# Patient Record
Sex: FEMALE | Race: WHITE | Employment: FULL TIME | URBAN - METROPOLITAN AREA
[De-identification: names, ages, dates, MRNs, and addresses within clinical notes are randomized per-mention and may not be internally consistent; named-entity substitution may affect disease eponyms.]

---

## 2017-10-05 ENCOUNTER — OFFICE VISIT (OUTPATIENT)
Dept: OBGYN CLINIC | Age: 42
End: 2017-10-05
Payer: COMMERCIAL

## 2017-10-05 ENCOUNTER — HOSPITAL ENCOUNTER (OUTPATIENT)
Age: 42
Setting detail: SPECIMEN
Discharge: HOME | End: 2017-10-05
Payer: COMMERCIAL

## 2017-10-05 VITALS
HEIGHT: 67 IN | WEIGHT: 233 LBS | RESPIRATION RATE: 16 BRPM | BODY MASS INDEX: 36.57 KG/M2 | OXYGEN SATURATION: 100 % | HEART RATE: 96 BPM | SYSTOLIC BLOOD PRESSURE: 117 MMHG | DIASTOLIC BLOOD PRESSURE: 78 MMHG

## 2017-10-05 DIAGNOSIS — Z01.419 ENCOUNTER FOR ROUTINE GYNECOLOGICAL EXAMINATION WITH PAPANICOLAOU SMEAR OF CERVIX: Primary | ICD-10-CM

## 2017-10-05 PROCEDURE — 99395 PREV VISIT EST AGE 18-39: CPT | Performed by: OBSTETRICS & GYNECOLOGY

## 2017-10-06 NOTE — PROGRESS NOTES
Johnn Litten  10/5/2017              46 y.o. Chief Complaint   Patient presents with    Gynecologic Exam     last pap was 16 wnl     Breast Problem     nipple pimple               No referring provider defined for this encounter. The patient was seen and examined. She has no chief complaint today and is here for her annual exam.  Her bowels are regular. There are no voiding complaints. She denies any bloating. She denies vaginal discharge. HPI : 44yo  for annual exam, had an area on areolae that was like a pimple, seems to have popped and now healing.   ________________________________________________________________________    Past Medical History:   Diagnosis Date    Asthma     Gestational diabetes     Varicosities                                                                    Past Surgical History:   Procedure Laterality Date    ABDOMEN SURGERY       SECTION      DILATION AND CURETTAGE OF UTERUS       History reviewed. No pertinent family history. History   Smoking Status    Never Smoker   Smokeless Tobacco    Never Used     History   Alcohol Use No       MEDICATIONS:  Current Outpatient Prescriptions   Medication Sig Dispense Refill    ibuprofen (ADVIL;MOTRIN) 800 MG tablet Take 1 tablet by mouth every 8 hours as needed 60 tablet 0    PROAIR  (90 BASE) MCG/ACT inhaler   0    ALBUTEROL IN Inhale  into the lungs as needed.  fluticasone-salmeterol (ADVAIR DISKUS) 250-50 MCG/DOSE AEPB Inhale 1 puff into the lungs daily.  fish oil-omega-3 fatty acids 1000 MG capsule Take  by mouth daily. Indications: 2 pills once daily pt not sure of the dose       No current facility-administered medications for this visit. ALLERGIES:  Iv contrast [iodides]  Immunization status: stated as current, but no records available. Gynecologic History:     Patient's last menstrual period was 2017 (exact date).     Sexually Active: Yes    STD History: No     Permanent Sterilization: vas  Reversible Birth Control: No        Hormone Replacement Exposure: No      Family History of Breast, Ovarian , Colon or Uterine Cancer: No   If YES see scanned worksheet. Preventative Health Testing:  Date of Last Pap Smear: 2016  Abnormal Pap Smear History:   Colposcopy History:   Date of Last Mammogram: na  Date of Last Colonoscopy:   Date of Last Bone Density:      ________________________________________________________________________  REVIEW OF SYSTEMS:        A minimum of an eleven point review of systems was completed and found to be negative except for the pertinent positives found below. Constitutional: No fever, chills or malaise; No weight change or fatigue  Head and Eyes: No vision, Headache, Dizziness or trauma in last 12 months  ENT ROS: No hearing, Tinnitis, sinus or taste problems  Hematological and Lymphatic ROS:No Lymphoma, Von Willebrand's, Hemophillia or Bleeding History  Psych ROS: No Depression, Homicidal thoughts,suicidal thoughts, or anxiety  Breast ROS: No prior breast abnormalities or lumps  Respiratory ROS: No SOB, Pneumoniae,Cough, or Pulmonary Embolism History  Cardiovascular ROS: No Chest Pain with Exertion, Palpitations, Syncope, Edema, Arrhythmia  Gastrointestinal ROS: No Indigestion, Heartburn, Nausea, vomiting, Diahrea, Constipation,or Bowel Changes; No Bloody Stools or melena  Genito-Urinary ROS: No Dysuria, Hematuria or Nocturia.  No Urinary Incontinence or Vaginal Discharge  Musculoskeletal ROS: No Arthralgia, Arthritis,Gout,Osteoporosis or Rheumatism  Neurological ROS: No CVA, Migraines, Epilepsy, Seizure Hx, or Limb Weakness  Dermatological ROS: No Rash, Itching, Hives, Mole Changes or Cancer                                                                                                                                                                                                                                PHYSICAL Exam: Constitutional:  Blood pressure 117/78, pulse 96, resp. rate 16, height 5' 7\" (1.702 m), weight 233 lb (105.7 kg), last menstrual period 09/14/2017, SpO2 100 %, not currently breastfeeding. General Appearance: This  is a well Developed, well Nourished, well groomed female. Her BMI was reviewed. Nutritional decision making was discussed. Skin:  There was a Normal Inspection of the skin without rashes or lesions. There were no rashes. (Papular, Maculopapular, Hives, Pustular, Macular)     There were no lesions (Ulcers, Erythema, Abn. Appearing Nevi)        Lymphatic:  No Lymph Nodes were Palpable in the neck , axilla or groin. Inguinal lymph nodes wnl   Neck and EENT:  The neck was supple. There were no masses   The thyroid was not enlarged and had no masses. Perrla, EOMI B/L  Respiratory: The lungs were auscultated and found to be clear. There were no rales, rhonchi or wheezes. There was a good respiratory effort. Cardiovascular: The heart was in a regular rate and rhythm. . No S3 or S4. There was no murmur appreciated. Location, grade, and radiation are not applicable. Extremities: The patients extremities were without calf tenderness, edema, or varicosities. There was full range of motion in all four extremities. Abdomen: The abdomen was soft and non-tender. There were good bowel sounds in all quadrants and there was no guarding, rebound or rigidity. On evaluation there was no evidence of hepatosplenomegaly and there was no costal vertebral mariely tenderness bilaterally. No hernias were appreciated. Abdominal Scars: healed    Psych: The patient had a normal Orientation to: Time, Place, Person, and Situation  There is no Mood / Affect changes    Breast:  (Chest)  normal appearance, no masses or tenderness, healing nipple area  Self breast exams were reviewed in detail. Literature was given.     Pelvic Exam:  Vulva and vagina appear normal. Bimanual exam reveals normal uterus and

## 2017-10-12 LAB — CYTOLOGY REPORT: NORMAL

## 2018-03-19 ENCOUNTER — OFFICE VISIT (OUTPATIENT)
Dept: OBGYN CLINIC | Age: 43
End: 2018-03-19
Payer: COMMERCIAL

## 2018-03-19 VITALS
BODY MASS INDEX: 36.1 KG/M2 | HEART RATE: 101 BPM | DIASTOLIC BLOOD PRESSURE: 82 MMHG | WEIGHT: 230 LBS | HEIGHT: 67 IN | SYSTOLIC BLOOD PRESSURE: 122 MMHG

## 2018-03-19 DIAGNOSIS — R10.31 RIGHT LOWER QUADRANT ABDOMINAL PAIN: Primary | ICD-10-CM

## 2018-03-19 PROCEDURE — 99213 OFFICE O/P EST LOW 20 MIN: CPT | Performed by: OBSTETRICS & GYNECOLOGY

## 2018-03-19 PROCEDURE — G8484 FLU IMMUNIZE NO ADMIN: HCPCS | Performed by: OBSTETRICS & GYNECOLOGY

## 2018-03-19 PROCEDURE — G8427 DOCREV CUR MEDS BY ELIG CLIN: HCPCS | Performed by: OBSTETRICS & GYNECOLOGY

## 2018-03-19 PROCEDURE — 1036F TOBACCO NON-USER: CPT | Performed by: OBSTETRICS & GYNECOLOGY

## 2018-03-19 PROCEDURE — G8417 CALC BMI ABV UP PARAM F/U: HCPCS | Performed by: OBSTETRICS & GYNECOLOGY

## 2018-04-05 ENCOUNTER — HOSPITAL ENCOUNTER (OUTPATIENT)
Dept: ULTRASOUND IMAGING | Age: 43
Discharge: HOME | End: 2018-04-07
Payer: COMMERCIAL

## 2018-04-05 DIAGNOSIS — R10.31 RIGHT LOWER QUADRANT ABDOMINAL PAIN: ICD-10-CM

## 2018-04-05 PROCEDURE — 76830 TRANSVAGINAL US NON-OB: CPT

## 2018-04-05 PROCEDURE — 76856 US EXAM PELVIC COMPLETE: CPT

## 2018-10-07 ENCOUNTER — HOSPITAL ENCOUNTER (EMERGENCY)
Age: 43
Discharge: HOME | End: 2018-10-07
Payer: COMMERCIAL

## 2018-10-07 ENCOUNTER — APPOINTMENT (OUTPATIENT)
Dept: CT IMAGING | Age: 43
End: 2018-10-07
Payer: COMMERCIAL

## 2018-10-07 VITALS
OXYGEN SATURATION: 97 % | WEIGHT: 230 LBS | BODY MASS INDEX: 36.1 KG/M2 | RESPIRATION RATE: 18 BRPM | HEIGHT: 67 IN | SYSTOLIC BLOOD PRESSURE: 131 MMHG | HEART RATE: 114 BPM | TEMPERATURE: 98.2 F | DIASTOLIC BLOOD PRESSURE: 72 MMHG

## 2018-10-07 DIAGNOSIS — S09.90XA INJURY OF HEAD, INITIAL ENCOUNTER: Primary | ICD-10-CM

## 2018-10-07 PROCEDURE — 99284 EMERGENCY DEPT VISIT MOD MDM: CPT

## 2018-10-07 PROCEDURE — 70450 CT HEAD/BRAIN W/O DYE: CPT

## 2018-10-07 RX ORDER — BUTALBITAL, ACETAMINOPHEN AND CAFFEINE 50; 325; 40 MG/1; MG/1; MG/1
1 TABLET ORAL EVERY 4 HOURS PRN
Qty: 20 TABLET | Refills: 0 | Status: SHIPPED | OUTPATIENT
Start: 2018-10-07 | End: 2019-05-07

## 2018-10-07 RX ORDER — ONDANSETRON 4 MG/1
4 TABLET, ORALLY DISINTEGRATING ORAL EVERY 8 HOURS PRN
Qty: 20 TABLET | Refills: 0 | Status: SHIPPED | OUTPATIENT
Start: 2018-10-07 | End: 2019-05-07

## 2018-10-07 RX ORDER — KETOROLAC TROMETHAMINE 30 MG/ML
60 INJECTION, SOLUTION INTRAMUSCULAR; INTRAVENOUS ONCE
Status: DISCONTINUED | OUTPATIENT
Start: 2018-10-07 | End: 2018-10-07 | Stop reason: HOSPADM

## 2018-10-07 ASSESSMENT — ENCOUNTER SYMPTOMS
NAUSEA: 1
EYE PAIN: 0
PHOTOPHOBIA: 1
VOMITING: 0
SHORTNESS OF BREATH: 0
COUGH: 0
COLOR CHANGE: 0

## 2018-10-07 ASSESSMENT — PAIN DESCRIPTION - PAIN TYPE: TYPE: ACUTE PAIN

## 2018-10-07 ASSESSMENT — PAIN DESCRIPTION - DESCRIPTORS: DESCRIPTORS: ACHING

## 2018-10-07 ASSESSMENT — PAIN SCALES - GENERAL: PAINLEVEL_OUTOF10: 6

## 2018-10-07 ASSESSMENT — PAIN DESCRIPTION - LOCATION: LOCATION: HEAD

## 2018-10-07 NOTE — ED PROVIDER NOTES
kg)   Height: 5' 7\" (1.702 m)       MEDICATIONS GIVEN IN THE ED:  Medications   ketorolac (TORADOL) injection 60 mg (not administered)       CLINICAL DECISION MAKING:  The patient presented alert with a nontoxic appearance and was seen in conjunction with Dr. Shaun Canada. CT scan was negative for acute findings. She was given information on concussions. Prescriptions were written for Zofran and Fioricet. Follow up with Occupational Health, return to ED if condition worsens. FINAL IMPRESSION      1.  Injury of head, initial encounter            Problem List  Patient Active Problem List   Diagnosis Code    PGDM O24.919    Asthma J45.909         DISPOSITION/PLAN   DISPOSITION  DISCHARGE      PATIENT REFERRED TO:   Jocelyne Herman MD  [facility] [id number]  [id number]  [phone]    Schedule an appointment as soon as possible for a visit       [facility] ED  [address]  [phone]    If symptoms worsen, As needed      DISCHARGE MEDICATIONS:     Discharge Medication List as of 10/7/2018 11:33 AM      START taking these medications    Details   ondansetron (ZOFRAN ODT) 4 MG disintegrating tablet Take 1 tablet by mouth every 8 hours as needed for Nausea or Vomiting, Disp-20 tablet, R-0Print      butalbital-acetaminophen-caffeine (FIORICET, ESGIC) -40 MG per tablet Take 1 tablet by mouth every 4 hours as needed for Headaches, Disp-20 tablet, R-0Print                 (Please note that portions of this note were completed with a voice recognition program.  Efforts were made to edit the dictations but occasionally words are mis-transcribed.)    WILFRIDO Jenkins CNP, APRN - CNP  10/07/18 [id number]

## 2019-05-07 ENCOUNTER — OFFICE VISIT (OUTPATIENT)
Dept: OBGYN CLINIC | Age: 44
End: 2019-05-07
Payer: COMMERCIAL

## 2019-05-07 ENCOUNTER — HOSPITAL ENCOUNTER (OUTPATIENT)
Age: 44
Setting detail: SPECIMEN
Discharge: HOME | End: 2019-05-07
Payer: COMMERCIAL

## 2019-05-07 VITALS
WEIGHT: 215 LBS | SYSTOLIC BLOOD PRESSURE: 132 MMHG | DIASTOLIC BLOOD PRESSURE: 81 MMHG | BODY MASS INDEX: 33.74 KG/M2 | HEIGHT: 67 IN | HEART RATE: 97 BPM

## 2019-05-07 DIAGNOSIS — Z12.31 VISIT FOR SCREENING MAMMOGRAM: ICD-10-CM

## 2019-05-07 DIAGNOSIS — Z13.220 SCREENING CHOLESTEROL LEVEL: ICD-10-CM

## 2019-05-07 DIAGNOSIS — Z13.21 ENCOUNTER FOR VITAMIN DEFICIENCY SCREENING: ICD-10-CM

## 2019-05-07 DIAGNOSIS — R53.83 FATIGUE, UNSPECIFIED TYPE: ICD-10-CM

## 2019-05-07 DIAGNOSIS — N92.6 ABNORMAL MENSTRUAL PERIODS: ICD-10-CM

## 2019-05-07 DIAGNOSIS — Z01.419 ENCOUNTER FOR GYNECOLOGICAL EXAMINATION WITHOUT ABNORMAL FINDING: Primary | ICD-10-CM

## 2019-05-07 PROCEDURE — 99396 PREV VISIT EST AGE 40-64: CPT | Performed by: OBSTETRICS & GYNECOLOGY

## 2019-05-07 ASSESSMENT — PATIENT HEALTH QUESTIONNAIRE - PHQ9
SUM OF ALL RESPONSES TO PHQ9 QUESTIONS 1 & 2: 2
SUM OF ALL RESPONSES TO PHQ QUESTIONS 1-9: 2
2. FEELING DOWN, DEPRESSED OR HOPELESS: 1
1. LITTLE INTEREST OR PLEASURE IN DOING THINGS: 1
SUM OF ALL RESPONSES TO PHQ QUESTIONS 1-9: 2

## 2019-05-07 NOTE — PROGRESS NOTES
DATE OF VISIT:  19        History and Physical    Sandra Corado    :  1975  CHIEF COMPLAINT:    Chief Complaint   Patient presents with    Gynecologic Exam     prev pap 2017, neg                    HPI :   Sandra Corado is a 41 y.o. femaleGRAVIDA 3 PARA     Sandra Corado returns today for her annual exam.  She presents today stating that her cycles have gone from every 28 days to know 21-24. Flow is moderately heavy but without any significant cramping. She is having regular bowel movements without constipation or diarrhea. She denies any involuntary loss of urine. 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 She does have a psychologist who she is meeting with later today. She is otherwise without any significant complaints today. Tiffanie Landry has not had a mammogram nor has she had any wellness screening blood work.  _____________________________________________________________________  Past Medical History:   Diagnosis Date    Asthma     Gestational diabetes     Varicosities                                                                    Past Surgical History:   Procedure Laterality Date     SECTION      CHOLECYSTECTOMY      DILATION AND CURETTAGE OF UTERUS       Family History   Problem Relation Age of Onset    Diabetes Brother      Social History     Tobacco Use   Smoking Status Never Smoker   Smokeless Tobacco Never Used     Social History     Substance and Sexual Activity   Alcohol Use No     Current Outpatient Medications   Medication Sig Dispense Refill    PROAIR  (90 BASE) MCG/ACT inhaler   0    ALBUTEROL IN Inhale  into the lungs as needed.  fluticasone-salmeterol (ADVAIR DISKUS) 250-50 MCG/DOSE AEPB Inhale 1 puff into the lungs daily.  fish oil-omega-3 fatty acids 1000 MG capsule Take  by mouth daily.     Indications: 2 pills once daily pt not sure of the dose       No current facility-administered medications for this visit. Allergies: Allergies   Allergen Reactions    Iv Contrast [Iodides] Itching     Given during CT scan       Gynecologic History:  No LMP recorded. Sexually Active: Yes  STD History: No  Abnormal Pap History no  Birth Control: Yes , vasectomy    OB History    Para Term  AB Living   3 1 1 0 1 2   SAB TAB Ectopic Molar Multiple Live Births   0 0 0 0 0 1     ______________________________________________________________________  REVIEW OF SYSTEMS:        Constitutional:  Unexpected weight change no  Neurological:  Frequent headaches  no  Ophthalmic:  Recent visual changes no  ENT:   Difficulty swallowing  no  Breast:              Masses   no     Respiratory:  Shortness of breath  no    Cardiovascular: Chest pain   no     Gastrointestinal: Chronic diarrhea/constipation no   Urogenital:  Urinary incontinence  no                                         Heavy/irregular periods           no                                      Vaginal discharge                   no  Hematological: Bruises easy   no     Endocrine:  Nipple Discharge  no     Hot/Cold Intolerance  no   Psychological:  Mood and affect were wnl yes                                                                                                                                           Physical Exam:    Vitals:    19 1004   BP: 132/81   Site: Right Upper Arm   Position: Sitting   Cuff Size: Large Adult   Pulse: 97   Weight: 215 lb (97.5 kg)   Height: 5' 7\" (1.702 m)       General Appearance:  She does not appear to be in any distress. This  is a well developed, well nourished, well groomed female. Neurological:  The patient is alert and oriented to time, place, person, and situation without any noted sensory motor deficits. Skin:  A brief inspection of the skin revealed no rashes or lesions. Neck:  The neck was supple.   There is no tracheal deviation, thyromegaly or supraclavicular adenopathy appreciated. Breast:   The patients breasts were symmetrical.  Breasts are nontender and there  were no masses, discharge or pathologic skin changes. There is no supraclavicular or axillary adenopathy bilaterally. Respiratory: There was unlabored respiratory effort. Lungs clear to ascultation without wheezes, rales or rhonchi in all fields bilaterally. Cardiovascular:  Normal sinus rhythm with a regular rate and without murmur, rubs or gallops. Abdomen: The abdomen was soft and non-tender with no guarding, rebound, CVAT or rigidity. No hernias were appreciated. Bowel sounds were normally active. Pelvic exam:  No vulvar, vaginal or cervical lesions are noted. Normal vaginal discharge present, no significant cystocele, rectocele or enterocele noted. Uterus nongravid and without CMT and adnexa nontender and without abnormal masses bilaterally. Extremities:  FROM and nontender without clubbing cyanosis or edema. ASSESSMENT:         ICD-10-CM    1. Encounter for gynecological examination without abnormal finding Z01.419 PAP SMEAR   2. Visit for screening mammogram Z12.31 WENDY DIGITAL SCREEN W CAD BILATERAL   3. Fatigue, unspecified type R53.83 Vitamin D 25 Hydroxy     TSH without Reflex     CBC Auto Differential     Vitamin B12   4. Screening cholesterol level Z13.220 Lipid, Fasting   5. Encounter for vitamin deficiency screening Z13.21 Vitamin D 25 Hydroxy     TSH without Reflex     CBC Auto Differential     Vitamin B12   6. Abnormal menstrual periods N92.6                    PLAN:    Return to the office in 1 year or when necessary  Patient was seen with total face to face time of 20 minutes. Carlos A Billings M.D., Mph  [facility]  [place]

## 2019-05-07 NOTE — PATIENT INSTRUCTIONS
Please get your mammogram done at your convenience. We will contact you with the results of your mammogram, blood work and today's Pap smear. Return to the office in 1 year or as needed. Have a fantastic summer and wonderful year!

## 2019-05-13 DIAGNOSIS — Z13.220 SCREENING CHOLESTEROL LEVEL: ICD-10-CM

## 2019-05-13 DIAGNOSIS — Z13.21 ENCOUNTER FOR VITAMIN DEFICIENCY SCREENING: ICD-10-CM

## 2019-05-13 DIAGNOSIS — R53.83 FATIGUE, UNSPECIFIED TYPE: ICD-10-CM

## 2019-05-14 LAB — CYTOLOGY REPORT: NORMAL

## 2019-05-16 ENCOUNTER — HOSPITAL ENCOUNTER (OUTPATIENT)
Dept: MAMMOGRAPHY | Age: 44
Discharge: HOME | End: 2019-05-18
Payer: COMMERCIAL

## 2019-05-16 DIAGNOSIS — Z12.31 VISIT FOR SCREENING MAMMOGRAM: ICD-10-CM

## 2019-05-16 PROCEDURE — 77063 BREAST TOMOSYNTHESIS BI: CPT

## 2020-09-03 ENCOUNTER — OFFICE VISIT (OUTPATIENT)
Dept: OBGYN CLINIC | Age: 45
End: 2020-09-03
Payer: COMMERCIAL

## 2020-09-03 ENCOUNTER — HOSPITAL ENCOUNTER (OUTPATIENT)
Age: 45
Setting detail: SPECIMEN
Discharge: HOME | End: 2020-09-03
Payer: COMMERCIAL

## 2020-09-03 VITALS
DIASTOLIC BLOOD PRESSURE: 70 MMHG | WEIGHT: 238 LBS | HEIGHT: 67 IN | BODY MASS INDEX: 37.35 KG/M2 | SYSTOLIC BLOOD PRESSURE: 122 MMHG

## 2020-09-03 PROCEDURE — 99396 PREV VISIT EST AGE 40-64: CPT | Performed by: OBSTETRICS & GYNECOLOGY

## 2020-09-03 RX ORDER — FLUOXETINE HYDROCHLORIDE 20 MG/1
20 CAPSULE ORAL DAILY
COMMUNITY

## 2020-09-03 RX ORDER — MONTELUKAST SODIUM 10 MG/1
10 TABLET ORAL NIGHTLY
COMMUNITY

## 2020-09-03 RX ORDER — DEXTROAMPHETAMINE SACCHARATE, AMPHETAMINE ASPARTATE, DEXTROAMPHETAMINE SULFATE AND AMPHETAMINE SULFATE 2.5; 2.5; 2.5; 2.5 MG/1; MG/1; MG/1; MG/1
10 TABLET ORAL DAILY
COMMUNITY
End: 2021-09-23 | Stop reason: ALTCHOICE

## 2020-09-03 ASSESSMENT — PATIENT HEALTH QUESTIONNAIRE - PHQ9
SUM OF ALL RESPONSES TO PHQ9 QUESTIONS 1 & 2: 2
SUM OF ALL RESPONSES TO PHQ QUESTIONS 1-9: 2
1. LITTLE INTEREST OR PLEASURE IN DOING THINGS: 1
SUM OF ALL RESPONSES TO PHQ QUESTIONS 1-9: 2
2. FEELING DOWN, DEPRESSED OR HOPELESS: 1

## 2020-09-03 NOTE — PATIENT INSTRUCTIONS
Please get your mammogram done as scheduled. We will contact you with the results of today's Pap smear as well as your mammogram.  Return to the office in 1 year or as needed. Have a safe and healthy year!

## 2020-09-03 NOTE — PROGRESS NOTES
DATE OF VISIT:  9/3/20        History and Physical    Rosalia Marshall    :  1975  CHIEF COMPLAINT:    Chief Complaint   Patient presents with    Annual Exam     Here for annual  Last pap 19 neg Last mammogram 19 neg                    HPI :   Rosalia Marshall is a 45 y.o. femaleGRAVIDA 3 PARA [facility] returns today for her annual exam.  She continues to have regular monthly cycles without any BTB. She is having regular bowel movements without constipation or diarrhea. She denies any UTI symptoms or involuntary loss of urine. She denies any S/S of COVID-19 and is otherwise without any significant complaints today. 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 She states that she has been overwhelmed and has stopped seeing her psychologist.  She is going to take some VTO and will probably reestablish with her psychologist.  _____________________________________________________________________  Past Medical History:   Diagnosis Date    Asthma     Gestational diabetes     Varicosities                                                                    Past Surgical History:   Procedure Laterality Date     SECTION      CHOLECYSTECTOMY      DILATION AND CURETTAGE OF UTERUS       Family History   Problem Relation Age of Onset    Diabetes Brother      Social History     Tobacco Use   Smoking Status Never Smoker   Smokeless Tobacco Never Used     Social History     Substance and Sexual Activity   Alcohol Use No     Current Outpatient Medications   Medication Sig Dispense Refill    FLUoxetine (PROZAC) 20 MG capsule Take 20 mg by mouth daily      amphetamine-dextroamphetamine (ADDERALL) 10 MG tablet Take 10 mg by mouth daily.  montelukast (SINGULAIR) 10 MG tablet Take 10 mg by mouth nightly      ALBUTEROL IN Inhale  into the lungs as needed.         PROAIR  (90 BASE) MCG/ACT inhaler   0    fluticasone-salmeterol (ADVAIR DISKUS) 250-50 MCG/DOSE AEPB Inhale 1 puff into the lungs daily.  fish oil-omega-3 fatty acids 1000 MG capsule Take  by mouth daily. Indications: 2 pills once daily pt not sure of the dose       No current facility-administered medications for this visit. Allergies: Allergies   Allergen Reactions    Iv Contrast [Iodides] Itching     Given during CT scan       Gynecologic History:  Patient's last menstrual period was 2020. Sexually Active: Yes  STD History: No  Abnormal Pap History no  Birth Control: Yes , shantelle    OB History    Para Term  AB Living   3 1 1 0 1 2   SAB TAB Ectopic Molar Multiple Live Births   0 0 0 0 0 1     ______________________________________________________________________  REVIEW OF SYSTEMS:        Constitutional:  Unexpected weight change no  Neurological:  Frequent headaches  no  Ophthalmic:  Recent visual changes no  ENT:   Difficulty swallowing  no  Breast:              Masses   no     Respiratory:  Shortness of breath  no    Cardiovascular: Chest pain   no     Gastrointestinal: Chronic diarrhea/constipation no   Urogenital:  Urinary incontinence  no                                         Heavy/irregular periods           no                                      Vaginal discharge                   no  Hematological: Bruises easy   no     Endocrine:  Nipple Discharge  no     Hot/Cold Intolerance  no   Psychological:  Mood and affect were wnl yes                                                                                                                                           Physical Exam:    Vitals:    20 0857   BP: 122/70   Site: Right Upper Arm   Position: Sitting   Cuff Size: Medium Adult   Weight: 238 lb (108 kg)   Height: 5' 7\" (1.702 m)       General Appearance:  She does not appear to be in any distress. This  is a well developed, well nourished, well groomed female.         Neurological:  The patient is alert and oriented to time, place, person, and situation without any noted sensory motor deficits. Skin:  A brief inspection of the skin revealed no rashes or lesions. Neck:  The neck was supple. There is no tracheal deviation, thyromegaly or supraclavicular adenopathy appreciated. Breast:   The patients breasts were symmetrical.  Breasts are nontender and there  were no masses, discharge or pathologic skin changes. There is no supraclavicular or axillary adenopathy bilaterally. Respiratory: There was unlabored respiratory effort. Lungs clear to ascultation without wheezes, rales or rhonchi in all fields bilaterally. Cardiovascular:  Normal sinus rhythm with a regular rate and without murmur, rubs or gallops. Abdomen: The abdomen was soft and non-tender with no guarding, rebound, CVAT or rigidity. No hernias were appreciated. Bowel sounds were normally active. Pelvic exam:  No vulvar, vaginal or cervical lesions are noted. Normal vaginal discharge present, no significant cystocele, rectocele or enterocele noted. Uterus nongravid and without CMT and adnexa nontender and without abnormal masses bilaterally. Extremities:  FROM and nontender without clubbing cyanosis or edema. ASSESSMENT:         ICD-10-CM    1. Encounter for gynecological examination without abnormal finding  Z01.419 PAP SMEAR   2. Visit for screening mammogram  Z12.31 WENDY DIGITAL SCREEN W OR WO CAD BILATERAL                   PLAN:    Return to the office in 1 year or when necessary  Patient was seen with total face to face time of 20 minutes. Emerald Armendariz M.D., Mph  [facility]  [place]

## 2020-09-12 LAB
HPV SOURCE: NORMAL
HPV, GENOTYPE 16: NOT DETECTED
HPV, GENOTYPE 18: NOT DETECTED
HPV, HIGH RISK OTHER: NOT DETECTED

## 2020-09-15 LAB — CYTOLOGY REPORT: NORMAL

## 2020-10-24 ENCOUNTER — HOSPITAL ENCOUNTER (OUTPATIENT)
Dept: MAMMOGRAPHY | Age: 45
Discharge: HOME | End: 2020-10-26
Payer: COMMERCIAL

## 2020-10-24 PROCEDURE — 77063 BREAST TOMOSYNTHESIS BI: CPT

## 2021-01-26 ENCOUNTER — OFFICE VISIT (OUTPATIENT)
Dept: OBGYN CLINIC | Age: 46
End: 2021-01-26
Payer: COMMERCIAL

## 2021-01-26 VITALS
WEIGHT: 237 LBS | SYSTOLIC BLOOD PRESSURE: 110 MMHG | DIASTOLIC BLOOD PRESSURE: 70 MMHG | HEIGHT: 67 IN | TEMPERATURE: 96 F | BODY MASS INDEX: 37.2 KG/M2

## 2021-01-26 DIAGNOSIS — N94.6 DYSMENORRHEA: ICD-10-CM

## 2021-01-26 DIAGNOSIS — N92.0 MENORRHAGIA WITH REGULAR CYCLE: Primary | ICD-10-CM

## 2021-01-26 PROCEDURE — 99213 OFFICE O/P EST LOW 20 MIN: CPT | Performed by: OBSTETRICS & GYNECOLOGY

## 2021-01-26 NOTE — PROGRESS NOTES
Examination chaperoned by Mali Davis.
The life threatening side effect profile was reviewed in detail this includes but is not limited to shortness of breath, chest pain, severe or persistent headaches, or calf pain. If any of these occur the patient has been instructed to stop using her hormonal based contraception, notify the office, and go to the emergency department or call 911. She denied any personal history of blood clots in her leg, lung, or heart. She has no absolute contraindications for contraception and at the end of discussion she desires    Discussed low-dose continuous OCPs, Nexplanon, Mirena and endometrial ablation. She is undecided and was given information to read over. She will call the office when she has made a decision.

## 2021-01-26 NOTE — PATIENT INSTRUCTIONS
Please read the information given to you on Nexplanon, Mirena IUD and NovaSure ablation. We also discussed using low-dose birth control pills, patches or vaginal ring to treat your painful cycles. Once you have made a decision, please call the office for an appointment or to call a prescription in. Plan on returning to the office no later than your annual exam in September. Please get your Covid vaccination when it becomes available!

## 2021-01-27 ENCOUNTER — TELEPHONE (OUTPATIENT)
Dept: OBGYN CLINIC | Age: 46
End: 2021-01-27

## 2021-01-27 NOTE — TELEPHONE ENCOUNTER
Pt called she has made a decision on her birth control but she would like to ask a few questions first

## 2021-02-04 ENCOUNTER — OFFICE VISIT (OUTPATIENT)
Dept: OBGYN CLINIC | Age: 46
End: 2021-02-04
Payer: COMMERCIAL

## 2021-02-04 VITALS
DIASTOLIC BLOOD PRESSURE: 82 MMHG | WEIGHT: 237 LBS | SYSTOLIC BLOOD PRESSURE: 122 MMHG | HEIGHT: 67 IN | BODY MASS INDEX: 37.2 KG/M2

## 2021-02-04 DIAGNOSIS — N92.0 MENORRHAGIA WITH REGULAR CYCLE: Primary | ICD-10-CM

## 2021-02-04 DIAGNOSIS — N94.6 DYSMENORRHEA: ICD-10-CM

## 2021-02-04 PROCEDURE — 99214 OFFICE O/P EST MOD 30 MIN: CPT | Performed by: OBSTETRICS & GYNECOLOGY

## 2021-02-04 NOTE — PROGRESS NOTES
DATE OF VISIT:  21        History and Physical    Atul Montes    :  1975  CHIEF COMPLAINT:    Chief Complaint   Patient presents with    Pre-op Exam     Here for consent on ablation                     HPI :   Atul oMntes is a 39 y.o. femaleGRAVIDA 3 PARA [facility] returns today to discuss GYN surgery. Jammie Copeland presented with a history of increasingly heavier and more painful menses. She was seen for an annual visit 2020 without complaints. She still has regular cycles and has been taking 600 mg OTC ibuprofen with suboptimal relief. She is here to discuss scheduling na endometrial ablation secondary to menorrhagia/dysmenorrhea. Patient education had previously been done regarding her diagnosis and management. She does desire to proceed with the intended procedure. She is otherwise without any significant complaints.  _____________________________________________________________________  Past Medical History:   Diagnosis Date    Asthma     Gestational diabetes     Varicosities                                                                    Past Surgical History:   Procedure Laterality Date     SECTION      CHOLECYSTECTOMY      DILATION AND CURETTAGE OF UTERUS       Family History   Problem Relation Age of Onset    Diabetes Brother      Social History     Tobacco Use   Smoking Status Never Smoker   Smokeless Tobacco Never Used     Social History     Substance and Sexual Activity   Alcohol Use No     Current Outpatient Medications   Medication Sig Dispense Refill    FLUoxetine (PROZAC) 20 MG capsule Take 20 mg by mouth daily      amphetamine-dextroamphetamine (ADDERALL) 10 MG tablet Take 10 mg by mouth daily.  montelukast (SINGULAIR) 10 MG tablet Take 10 mg by mouth nightly      PROAIR  (90 BASE) MCG/ACT inhaler   0    ALBUTEROL IN Inhale  into the lungs as needed.  fluticasone-salmeterol (ADVAIR DISKUS) 250-50 MCG/DOSE AEPB Inhale 1 puff into the lungs daily.  fish oil-omega-3 fatty acids 1000 MG capsule Take  by mouth daily. Indications: 2 pills once daily pt not sure of the dose       No current facility-administered medications for this visit. Allergies: Allergies   Allergen Reactions    Iv Contrast [Iodides] Itching     Given during CT scan       Gynecologic History:  Patient's last menstrual period was 2021. Sexually Active: Yes  STD History: No  Abnormal Pap History no  Birth Control: Yes, vasectomy    OB History    Para Term  AB Living   3 1 1 0 1 2   SAB TAB Ectopic Molar Multiple Live Births   0 0 0 0 0 1     ______________________________________________________________________  REVIEW OF SYSTEMS:        Constitutional:  Unexpected weight change no  Neurological:  Frequent headaches  no  Ophthalmic:  Recent visual changes no  ENT:   Difficulty swallowing  no  Breast:              Masses   no     Respiratory:  Shortness of breath  no    Cardiovascular: Chest pain   no     Gastrointestinal: Chronic diarrhea/constipation no   Urogenital:  Urinary incontinence  no                                         Heavy/irregular periods           yes                                      Vaginal discharge                   no  Hematological: Bruises easy   no     Endocrine:  Nipple Discharge  no     Hot/Cold Intolerance  no   Psychological:  Mood and affect were wnl yes                                                                                                                                           Physical Exam:    Vitals:    21 1444   BP: 122/82   Site: Right Upper Arm   Position: Sitting   Cuff Size: Large Adult   Weight: 237 lb (107.5 kg)   Height: 5' 7\" (1.702 m)       General Appearance:  She does not appear to be in any distress. This  is a well developed, well nourished, well groomed female.         Neurological: The patient is alert and oriented to time, place, person, and situation without any noted sensory motor deficits. Skin:  A brief inspection of the skin revealed no rashes or lesions. Neck:  The neck was supple. There is no tracheal deviation, thyromegaly or supraclavicular adenopathy appreciated. Respiratory: There was unlabored respiratory effort. Lungs clear to ascultation without wheezes, rales or rhonchi in all fields bilaterally. Cardiovascular:  Normal sinus rhythm with a regular rate and without murmur, rubs or gallops. Abdomen: The abdomen was soft and non-tender with no guarding, rebound, CVAT or rigidity. No hernias were appreciated. Bowel sounds were normally active. Pelvic exam:  No vulvar, vaginal or cervical lesions are noted. Normal vaginal discharge present, no significant cystocele, rectocele or enterocele noted. Uterus nongravid and without CMT and adnexa nontender and without abnormal masses bilaterally. Extremities:  FROM and nontender without clubbing cyanosis or edema. ASSESSMENT:    Diagnosis Orders   1. Menorrhagia with regular cycle     2. Dysmenorrhea         PLAN:    Proper informed consent was done, alternatives were discussed   and she understands the surgical risks to the proposed procedure including but not limited to: infection, hemorrhage, blood clot formation, damage to the gastrointestinal/genital urinary/neurological/vascular systems, death and failure in the proposed procedure's intent. She also understands the risks from transfusion including but not limited to: fever, allergic reaction, hepatitis B/C. and HIV. All her questions have been answered to her satisfaction. The consent has been signed for a   . Preop labs will include a CBC, type & screen, HCG and BMP. We will not require antibiotic prophylaxis and will use SCDs  for VTE prophylaxis. She was instructed not to use NSAIDs regularly within 4-5 days of her planned surgery. She will plan on returning to the office in 1-2 weeks postop. Raul Medrano MD, MPH, [facility] [place]    Patient was seen with total face to face time of 20 minutes.

## 2021-02-04 NOTE — PATIENT INSTRUCTIONS
Please carefully follow all the preoperative instructions given to you. Plan on returning to the office 10 to 14 days after surgery. Please call the office and ask for me if you have any questions or concerns before or after surgery.

## 2021-02-15 ENCOUNTER — HOSPITAL ENCOUNTER (OUTPATIENT)
Dept: LAB | Age: 46
Setting detail: SPECIMEN
Discharge: HOME | End: 2021-02-15
Payer: COMMERCIAL

## 2021-02-15 DIAGNOSIS — Z20.822 COVID-19 RULED OUT BY LABORATORY TESTING: Primary | ICD-10-CM

## 2021-02-15 PROCEDURE — U0003 INFECTIOUS AGENT DETECTION BY NUCLEIC ACID (DNA OR RNA); SEVERE ACUTE RESPIRATORY SYNDROME CORONAVIRUS 2 (SARS-COV-2) (CORONAVIRUS DISEASE [COVID-19]), AMPLIFIED PROBE TECHNIQUE, MAKING USE OF HIGH THROUGHPUT TECHNOLOGIES AS DESCRIBED BY CMS-2020-01-R: HCPCS

## 2021-02-15 PROCEDURE — U0005 INFEC AGEN DETEC AMPLI PROBE: HCPCS

## 2021-02-16 LAB
SARS-COV-2: ABNORMAL
SARS-COV-2: DETECTED
SOURCE: ABNORMAL

## 2021-02-17 ENCOUNTER — TELEPHONE (OUTPATIENT)
Dept: PRIMARY CARE CLINIC | Age: 46
End: 2021-02-17

## 2021-04-01 RX ORDER — SODIUM CHLORIDE, SODIUM LACTATE, POTASSIUM CHLORIDE, CALCIUM CHLORIDE 600; 310; 30; 20 MG/100ML; MG/100ML; MG/100ML; MG/100ML
1000 INJECTION, SOLUTION INTRAVENOUS CONTINUOUS
Status: CANCELLED | OUTPATIENT
Start: 2021-04-01

## 2021-04-05 ENCOUNTER — HOSPITAL ENCOUNTER (OUTPATIENT)
Dept: PREADMISSION TESTING | Age: 46
Discharge: HOME | End: 2021-04-09
Payer: COMMERCIAL

## 2021-04-05 VITALS
HEART RATE: 104 BPM | SYSTOLIC BLOOD PRESSURE: 146 MMHG | TEMPERATURE: 97.5 F | DIASTOLIC BLOOD PRESSURE: 89 MMHG | RESPIRATION RATE: 18 BRPM

## 2021-04-05 LAB
ABO/RH: NORMAL
ANION GAP SERPL CALCULATED.3IONS-SCNC: 8 MMOL/L (ref 9–17)
ANTIBODY SCREEN: NEGATIVE
ARM BAND NUMBER: NORMAL
BUN BLDV-MCNC: 9 MG/DL (ref 6–20)
BUN/CREAT BLD: ABNORMAL (ref 9–20)
CALCIUM SERPL-MCNC: 9 MG/DL (ref 8.6–10.4)
CHLORIDE BLD-SCNC: 100 MMOL/L (ref 98–107)
CO2: 26 MMOL/L (ref 20–31)
CREAT SERPL-MCNC: 0.47 MG/DL (ref 0.5–0.9)
EXPIRATION DATE: NORMAL
GFR AFRICAN AMERICAN: >60 ML/MIN
GFR NON-AFRICAN AMERICAN: >60 ML/MIN
GFR SERPL CREATININE-BSD FRML MDRD: ABNORMAL ML/MIN/{1.73_M2}
GFR SERPL CREATININE-BSD FRML MDRD: ABNORMAL ML/MIN/{1.73_M2}
GLUCOSE BLD-MCNC: 96 MG/DL (ref 70–99)
HCG QUALITATIVE: NEGATIVE
HCT VFR BLD CALC: 40.1 % (ref 36.3–47.1)
HEMOGLOBIN: 12.4 G/DL (ref 11.9–15.1)
MCH RBC QN AUTO: 26.2 PG (ref 25.2–33.5)
MCHC RBC AUTO-ENTMCNC: 30.9 G/DL (ref 28.4–34.8)
MCV RBC AUTO: 84.6 FL (ref 82.6–102.9)
NRBC AUTOMATED: 0 PER 100 WBC
PDW BLD-RTO: 14.3 % (ref 11.8–14.4)
PLATELET # BLD: 365 K/UL (ref 138–453)
PMV BLD AUTO: 8.9 FL (ref 8.1–13.5)
POTASSIUM SERPL-SCNC: 4 MMOL/L (ref 3.7–5.3)
RBC # BLD: 4.74 M/UL (ref 3.95–5.11)
SODIUM BLD-SCNC: 134 MMOL/L (ref 135–144)
WBC # BLD: 7.8 K/UL (ref 3.5–11.3)

## 2021-04-05 PROCEDURE — 86901 BLOOD TYPING SEROLOGIC RH(D): CPT

## 2021-04-05 PROCEDURE — 85027 COMPLETE CBC AUTOMATED: CPT

## 2021-04-05 PROCEDURE — 80048 BASIC METABOLIC PNL TOTAL CA: CPT

## 2021-04-05 PROCEDURE — 86850 RBC ANTIBODY SCREEN: CPT

## 2021-04-05 PROCEDURE — 86900 BLOOD TYPING SEROLOGIC ABO: CPT

## 2021-04-05 PROCEDURE — 84703 CHORIONIC GONADOTROPIN ASSAY: CPT

## 2021-04-05 PROCEDURE — 36415 COLL VENOUS BLD VENIPUNCTURE: CPT

## 2021-04-05 RX ORDER — LORATADINE 10 MG/1
10 TABLET ORAL DAILY PRN
COMMUNITY

## 2021-04-05 RX ORDER — CYANOCOBALAMIN (VITAMIN B-12) 5000 MCG
1000 TABLET,DISINTEGRATING ORAL DAILY
COMMUNITY

## 2021-04-05 ASSESSMENT — PAIN DESCRIPTION - DESCRIPTORS: DESCRIPTORS: CRAMPING

## 2021-04-15 ENCOUNTER — ANESTHESIA EVENT (OUTPATIENT)
Dept: OPERATING ROOM | Age: 46
End: 2021-04-15
Payer: COMMERCIAL

## 2021-04-15 NOTE — H&P
OB/GYN Pre-Op H&P  [address] [facility]    Patient Name: Stu Chan     Patient : 1975  [facility]  Admission Date/Time: 2021  5:44 AM  Primary Care Physician: Jason Stinson MD  MRN: [id number]    Date: 2021  Time: 7:40 AM    The patient was seen in pre-op holding. She is here for a scheduled surgery of a dilatation and curettage, hysteroscopy, and novasure ablation. She has a long history of heavy and painful menses. She has tried conservative treatment options without success. The procedure risks and complications were reviewed. The labs, Consent, and H&P were reviewed and updated. The patient was counseled on the possibility of  the need of a second surgery. The patient voiced understanding and had all of her questions answered. The possibility of incomplete removal of abnormal tissue was discussed. OBSTETRICAL HISTORY:   OB History    Para Term  AB Living   3 1 1 0 1 2   SAB TAB Ectopic Molar Multiple Live Births   0 0 0 0 0 1      # Outcome Date GA Lbr Marcos/2nd Weight Sex Delivery Anes PTL Lv   3 Term  40w0d  7 lb 12 oz (3.515 kg) F    GEO      Complications: Cephalopelvic Disproportion   2 AB            1                 PAST MEDICAL HISTORY:   has a past medical history of Asthma, COVID-19, Gestational diabetes, Varicosities, and Well adult health check. PAST SURGICAL HISTORY:   has a past surgical history that includes  section; Dilation and curettage of uterus; and Cholecystectomy.     ALLERGIES:  Allergies as of 2021 - Review Complete 2021   Allergen Reaction Noted    Iv contrast [iodides] Itching 2014       MEDICATIONS:  Current Facility-Administered Medications   Medication Dose Route Frequency Provider Last Rate Last Admin    lactated ringers infusion 1,000 mL  1,000 mL Intravenous Continuous Leslie Borja MD 50 mL/hr at 21 07 1,000 mL at 21       FAMILY HISTORY:  family history includes Diabetes in her brother. SOCIAL HISTORY:   reports that she has never smoked. She has never used smokeless tobacco. She reports current alcohol use. She reports that she does not use drugs.     VITALS:  Vitals:    04/16/21 0713   BP: 120/78   Pulse: 95   Resp: 16   Temp: 97.2 °F (36.2 °C)   TempSrc: Temporal   SpO2: 100%   Weight: 230 lb (104.3 kg)   Height: 5' 6\" (1.676 m)                                                                                                                               PHYSICAL EXAM:     Unchanged from Prior H&P  CONSTITUTIONAL:  Alert and oriented, no acute distress  HEAD: normocephalic, atraumatic  EYES: Pupils equal and reactive to light, Extraocular muscles intact, sclera non icteric  ENT: Mucus membranes moist, No otorrhea, no rhinorrhea  NECK:  supple, symmetrical, trachea midline   LUNGS:  Good air movement bilaterally, unlabored respirations, no wheezes or rhonchi  CARDIOVASCULAR: Regular rate and rhythm, no murmurs rubs or gallops  ABDOMEN: soft, non tender, non distended, no rebound or guarding, no hernias, no hepatomegaly, no splenomegly  MUSCULOSKELETAL:  Equal strength bilaterally, normal muscle tone  SKIN: No abscess or rash  NEUROLOGIC:  Cranial nerves 2-12 grossly intact, no focal deficits  PSYCH: affect appropriate  Pelvic Exam: deferred to OR      LAB RESULTS:  Hospital Outpatient Visit on 04/05/2021   Component Date Value Ref Range Status    WBC 04/05/2021 7.8  3.5 - 11.3 k/uL Final    RBC 04/05/2021 4.74  3.95 - 5.11 m/uL Final    Hemoglobin 04/05/2021 12.4  11.9 - 15.1 g/dL Final    Hematocrit 04/05/2021 40.1  36.3 - 47.1 % Final    MCV 04/05/2021 84.6  82.6 - 102.9 fL Final    MCH 04/05/2021 26.2  25.2 - 33.5 pg Final    MCHC 04/05/2021 30.9  28.4 - 34.8 g/dL Final    RDW 04/05/2021 14.3  11.8 - 14.4 % Final    Platelets 00/00/6718 365  138 - 453 k/uL Final    MPV 04/05/2021 8.9  8.1 - 13.5 fL Final    NRBC Automated

## 2021-04-16 ENCOUNTER — ANESTHESIA (OUTPATIENT)
Dept: OPERATING ROOM | Age: 46
End: 2021-04-16
Payer: COMMERCIAL

## 2021-04-16 ENCOUNTER — HOSPITAL ENCOUNTER (OUTPATIENT)
Age: 46
Setting detail: OUTPATIENT SURGERY
Discharge: HOME | End: 2021-04-16
Attending: OBSTETRICS & GYNECOLOGY | Admitting: OBSTETRICS & GYNECOLOGY
Payer: COMMERCIAL

## 2021-04-16 VITALS
TEMPERATURE: 97 F | SYSTOLIC BLOOD PRESSURE: 114 MMHG | DIASTOLIC BLOOD PRESSURE: 74 MMHG | HEIGHT: 66 IN | BODY MASS INDEX: 36.96 KG/M2 | OXYGEN SATURATION: 98 % | RESPIRATION RATE: 12 BRPM | HEART RATE: 74 BPM | WEIGHT: 230 LBS

## 2021-04-16 VITALS — TEMPERATURE: 96.6 F | OXYGEN SATURATION: 97 % | DIASTOLIC BLOOD PRESSURE: 86 MMHG | SYSTOLIC BLOOD PRESSURE: 122 MMHG

## 2021-04-16 DIAGNOSIS — Z98.890 POSTOPERATIVE STATE: Primary | ICD-10-CM

## 2021-04-16 PROBLEM — N92.0 MENORRHAGIA: Status: ACTIVE | Noted: 2021-04-16

## 2021-04-16 PROBLEM — N94.6 DYSMENORRHEA: Status: ACTIVE | Noted: 2021-04-16

## 2021-04-16 LAB — HCG, PREGNANCY URINE (POC): NEGATIVE

## 2021-04-16 PROCEDURE — 7100000041 HC SPAR PHASE II RECOVERY - ADDTL 15 MIN: Performed by: OBSTETRICS & GYNECOLOGY

## 2021-04-16 PROCEDURE — 7100000040 HC SPAR PHASE II RECOVERY - FIRST 15 MIN: Performed by: OBSTETRICS & GYNECOLOGY

## 2021-04-16 PROCEDURE — 88305 TISSUE EXAM BY PATHOLOGIST: CPT

## 2021-04-16 PROCEDURE — 2500000003 HC RX 250 WO HCPCS

## 2021-04-16 PROCEDURE — 2720000010 HC SURG SUPPLY STERILE: Performed by: OBSTETRICS & GYNECOLOGY

## 2021-04-16 PROCEDURE — 7100000000 HC PACU RECOVERY - FIRST 15 MIN: Performed by: OBSTETRICS & GYNECOLOGY

## 2021-04-16 PROCEDURE — 3700000000 HC ANESTHESIA ATTENDED CARE: Performed by: OBSTETRICS & GYNECOLOGY

## 2021-04-16 PROCEDURE — 2580000003 HC RX 258: Performed by: ANESTHESIOLOGY

## 2021-04-16 PROCEDURE — 3600000004 HC SURGERY LEVEL 4 BASE: Performed by: OBSTETRICS & GYNECOLOGY

## 2021-04-16 PROCEDURE — 81025 URINE PREGNANCY TEST: CPT

## 2021-04-16 PROCEDURE — 2709999900 HC NON-CHARGEABLE SUPPLY: Performed by: OBSTETRICS & GYNECOLOGY

## 2021-04-16 PROCEDURE — 3700000001 HC ADD 15 MINUTES (ANESTHESIA): Performed by: OBSTETRICS & GYNECOLOGY

## 2021-04-16 PROCEDURE — 7100000001 HC PACU RECOVERY - ADDTL 15 MIN: Performed by: OBSTETRICS & GYNECOLOGY

## 2021-04-16 PROCEDURE — 2580000003 HC RX 258: Performed by: OBSTETRICS & GYNECOLOGY

## 2021-04-16 PROCEDURE — 6370000000 HC RX 637 (ALT 250 FOR IP)

## 2021-04-16 PROCEDURE — 58563 HYSTEROSCOPY ABLATION: CPT | Performed by: OBSTETRICS & GYNECOLOGY

## 2021-04-16 PROCEDURE — 3600000014 HC SURGERY LEVEL 4 ADDTL 15MIN: Performed by: OBSTETRICS & GYNECOLOGY

## 2021-04-16 PROCEDURE — 6360000002 HC RX W HCPCS

## 2021-04-16 RX ORDER — FENTANYL CITRATE 50 UG/ML
25 INJECTION, SOLUTION INTRAMUSCULAR; INTRAVENOUS EVERY 5 MIN PRN
Status: DISCONTINUED | OUTPATIENT
Start: 2021-04-16 | End: 2021-04-16 | Stop reason: HOSPADM

## 2021-04-16 RX ORDER — IBUPROFEN 600 MG/1
600 TABLET ORAL EVERY 6 HOURS
Qty: 60 TABLET | Refills: 1 | Status: ON HOLD | OUTPATIENT
Start: 2021-04-16 | End: 2021-10-07 | Stop reason: HOSPADM

## 2021-04-16 RX ORDER — KETOROLAC TROMETHAMINE 30 MG/ML
INJECTION, SOLUTION INTRAMUSCULAR; INTRAVENOUS PRN
Status: DISCONTINUED | OUTPATIENT
Start: 2021-04-16 | End: 2021-04-16 | Stop reason: SDUPTHER

## 2021-04-16 RX ORDER — MAGNESIUM HYDROXIDE 1200 MG/15ML
LIQUID ORAL CONTINUOUS PRN
Status: COMPLETED | OUTPATIENT
Start: 2021-04-16 | End: 2021-04-16

## 2021-04-16 RX ORDER — LIDOCAINE HYDROCHLORIDE 10 MG/ML
INJECTION, SOLUTION EPIDURAL; INFILTRATION; INTRACAUDAL; PERINEURAL PRN
Status: DISCONTINUED | OUTPATIENT
Start: 2021-04-16 | End: 2021-04-16 | Stop reason: SDUPTHER

## 2021-04-16 RX ORDER — DOCUSATE SODIUM 100 MG/1
100 CAPSULE, LIQUID FILLED ORAL 2 TIMES DAILY PRN
Qty: 60 CAPSULE | Refills: 0 | Status: SHIPPED | OUTPATIENT
Start: 2021-04-16 | End: 2021-09-09

## 2021-04-16 RX ORDER — NEOSTIGMINE METHYLSULFATE 5 MG/5 ML
SYRINGE (ML) INTRAVENOUS PRN
Status: DISCONTINUED | OUTPATIENT
Start: 2021-04-16 | End: 2021-04-16 | Stop reason: SDUPTHER

## 2021-04-16 RX ORDER — ROCURONIUM BROMIDE 10 MG/ML
INJECTION, SOLUTION INTRAVENOUS PRN
Status: DISCONTINUED | OUTPATIENT
Start: 2021-04-16 | End: 2021-04-16 | Stop reason: SDUPTHER

## 2021-04-16 RX ORDER — SODIUM CHLORIDE, SODIUM LACTATE, POTASSIUM CHLORIDE, CALCIUM CHLORIDE 600; 310; 30; 20 MG/100ML; MG/100ML; MG/100ML; MG/100ML
1000 INJECTION, SOLUTION INTRAVENOUS CONTINUOUS
Status: DISCONTINUED | OUTPATIENT
Start: 2021-04-16 | End: 2021-04-16 | Stop reason: HOSPADM

## 2021-04-16 RX ORDER — ONDANSETRON 4 MG/1
4 TABLET, ORALLY DISINTEGRATING ORAL EVERY 8 HOURS PRN
Qty: 15 TABLET | Refills: 0 | Status: SHIPPED | OUTPATIENT
Start: 2021-04-16

## 2021-04-16 RX ORDER — FENTANYL CITRATE 50 UG/ML
INJECTION, SOLUTION INTRAMUSCULAR; INTRAVENOUS PRN
Status: DISCONTINUED | OUTPATIENT
Start: 2021-04-16 | End: 2021-04-16 | Stop reason: SDUPTHER

## 2021-04-16 RX ORDER — GLYCOPYRROLATE 1 MG/5 ML
SYRINGE (ML) INTRAVENOUS PRN
Status: DISCONTINUED | OUTPATIENT
Start: 2021-04-16 | End: 2021-04-16 | Stop reason: SDUPTHER

## 2021-04-16 RX ORDER — DEXAMETHASONE SODIUM PHOSPHATE 4 MG/ML
INJECTION, SOLUTION INTRA-ARTICULAR; INTRALESIONAL; INTRAMUSCULAR; INTRAVENOUS; SOFT TISSUE PRN
Status: DISCONTINUED | OUTPATIENT
Start: 2021-04-16 | End: 2021-04-16 | Stop reason: SDUPTHER

## 2021-04-16 RX ORDER — ONDANSETRON 2 MG/ML
4 INJECTION INTRAMUSCULAR; INTRAVENOUS
Status: DISCONTINUED | OUTPATIENT
Start: 2021-04-16 | End: 2021-04-16 | Stop reason: HOSPADM

## 2021-04-16 RX ORDER — MEPERIDINE HYDROCHLORIDE 50 MG/ML
12.5 INJECTION INTRAMUSCULAR; INTRAVENOUS; SUBCUTANEOUS EVERY 5 MIN PRN
Status: DISCONTINUED | OUTPATIENT
Start: 2021-04-16 | End: 2021-04-16 | Stop reason: HOSPADM

## 2021-04-16 RX ORDER — HYDROCODONE BITARTRATE AND ACETAMINOPHEN 5; 325 MG/1; MG/1
1 TABLET ORAL EVERY 6 HOURS PRN
Qty: 8 TABLET | Refills: 0 | Status: SHIPPED | OUTPATIENT
Start: 2021-04-16 | End: 2021-04-19

## 2021-04-16 RX ORDER — PROPOFOL 10 MG/ML
INJECTION, EMULSION INTRAVENOUS PRN
Status: DISCONTINUED | OUTPATIENT
Start: 2021-04-16 | End: 2021-04-16 | Stop reason: SDUPTHER

## 2021-04-16 RX ORDER — MIDAZOLAM HYDROCHLORIDE 1 MG/ML
INJECTION INTRAMUSCULAR; INTRAVENOUS PRN
Status: DISCONTINUED | OUTPATIENT
Start: 2021-04-16 | End: 2021-04-16 | Stop reason: SDUPTHER

## 2021-04-16 RX ORDER — ONDANSETRON 2 MG/ML
INJECTION INTRAMUSCULAR; INTRAVENOUS PRN
Status: DISCONTINUED | OUTPATIENT
Start: 2021-04-16 | End: 2021-04-16 | Stop reason: SDUPTHER

## 2021-04-16 RX ORDER — FENTANYL CITRATE 50 UG/ML
50 INJECTION, SOLUTION INTRAMUSCULAR; INTRAVENOUS EVERY 5 MIN PRN
Status: DISCONTINUED | OUTPATIENT
Start: 2021-04-16 | End: 2021-04-16 | Stop reason: HOSPADM

## 2021-04-16 RX ORDER — ALBUTEROL SULFATE 90 UG/1
AEROSOL, METERED RESPIRATORY (INHALATION) PRN
Status: DISCONTINUED | OUTPATIENT
Start: 2021-04-16 | End: 2021-04-16 | Stop reason: SDUPTHER

## 2021-04-16 RX ADMIN — LIDOCAINE HYDROCHLORIDE 50 MG: 10 INJECTION, SOLUTION EPIDURAL; INFILTRATION; INTRACAUDAL; PERINEURAL at 08:01

## 2021-04-16 RX ADMIN — ALBUTEROL SULFATE 4 PUFF: 90 AEROSOL, METERED RESPIRATORY (INHALATION) at 09:12

## 2021-04-16 RX ADMIN — PROPOFOL 200 MG: 10 INJECTION, EMULSION INTRAVENOUS at 08:01

## 2021-04-16 RX ADMIN — Medication 0.2 MG: at 08:58

## 2021-04-16 RX ADMIN — FENTANYL CITRATE 50 MCG: 50 INJECTION, SOLUTION INTRAMUSCULAR; INTRAVENOUS at 08:01

## 2021-04-16 RX ADMIN — SODIUM CHLORIDE, POTASSIUM CHLORIDE, SODIUM LACTATE AND CALCIUM CHLORIDE 1000 ML: 600; 310; 30; 20 INJECTION, SOLUTION INTRAVENOUS at 07:27

## 2021-04-16 RX ADMIN — Medication 0.5 MG: at 08:50

## 2021-04-16 RX ADMIN — ONDANSETRON 4 MG: 2 INJECTION INTRAMUSCULAR; INTRAVENOUS at 08:52

## 2021-04-16 RX ADMIN — FENTANYL CITRATE 50 MCG: 50 INJECTION, SOLUTION INTRAMUSCULAR; INTRAVENOUS at 07:58

## 2021-04-16 RX ADMIN — MIDAZOLAM HYDROCHLORIDE 2 MG: 1 INJECTION, SOLUTION INTRAMUSCULAR; INTRAVENOUS at 07:56

## 2021-04-16 RX ADMIN — ROCURONIUM BROMIDE 50 MG: 10 INJECTION INTRAVENOUS at 08:01

## 2021-04-16 RX ADMIN — KETOROLAC TROMETHAMINE 30 MG: 30 INJECTION, SOLUTION INTRAMUSCULAR; INTRAVENOUS at 08:31

## 2021-04-16 RX ADMIN — FENTANYL CITRATE 50 MCG: 50 INJECTION, SOLUTION INTRAMUSCULAR; INTRAVENOUS at 09:14

## 2021-04-16 RX ADMIN — FENTANYL CITRATE 50 MCG: 50 INJECTION, SOLUTION INTRAMUSCULAR; INTRAVENOUS at 08:09

## 2021-04-16 RX ADMIN — Medication 3.5 MG: at 08:51

## 2021-04-16 RX ADMIN — DEXAMETHASONE SODIUM PHOSPHATE 4 MG: 4 INJECTION, SOLUTION INTRAMUSCULAR; INTRAVENOUS at 08:15

## 2021-04-16 ASSESSMENT — PULMONARY FUNCTION TESTS
PIF_VALUE: 21
PIF_VALUE: 1
PIF_VALUE: 22
PIF_VALUE: 21
PIF_VALUE: 22
PIF_VALUE: 22
PIF_VALUE: 1
PIF_VALUE: 21
PIF_VALUE: 17
PIF_VALUE: 22
PIF_VALUE: 19
PIF_VALUE: 19
PIF_VALUE: 2
PIF_VALUE: 23
PIF_VALUE: 17
PIF_VALUE: 22
PIF_VALUE: 17
PIF_VALUE: 22
PIF_VALUE: 20
PIF_VALUE: 22
PIF_VALUE: 22
PIF_VALUE: 19
PIF_VALUE: 20
PIF_VALUE: 22
PIF_VALUE: 22
PIF_VALUE: 1
PIF_VALUE: 1
PIF_VALUE: 22
PIF_VALUE: 23
PIF_VALUE: 22
PIF_VALUE: 1
PIF_VALUE: 22
PIF_VALUE: 19
PIF_VALUE: 22
PIF_VALUE: 22

## 2021-04-16 ASSESSMENT — PAIN SCALES - GENERAL: PAINLEVEL_OUTOF10: 2

## 2021-04-16 NOTE — ANESTHESIA POSTPROCEDURE EVALUATION
Department of Anesthesiology  Postprocedure Note    Patient: Kriss Hicks  MRN: [id number]  YOB: 1975  Date of evaluation: 4/16/2021  Time:  2:09 PM     Procedure Summary     Date: 04/16/21 Room / Location: [address]    Anesthesia Start: 1790 Anesthesia Stop: 6323    Procedure: HYDROTHERMAL - DILATATION AND CURETTAGE, HYSTEROSCOPY, ENDOMETRIAL ABLATION (N/A Vagina ) Diagnosis: (HEAVY PAINFUL PERIODS, ABNORMAL UTERINE BLEEDING)    Surgeons: tSacy Al MD Responsible Provider: Sophie Miller MD    Anesthesia Type: general ASA Status: 3          Anesthesia Type: general    Laurence Phase I: Laurence Score: 10    Laurence Phase II: Laurence Score: 10    Last vitals: Reviewed and per EMR flowsheets.      POST-OP ANESTHESIA NOTE       /74   Pulse 74   Temp 97 °F (36.1 °C) (Temporal)   Resp 12   Ht 5' 6\" (1.676 m)   Wt 230 lb (104.3 kg)   LMP 04/05/2021   SpO2 98%   BMI 37.12 kg/m²    Pain Assessment: 0-10  Pain Level: 4       Anesthesia Post Evaluation    Patient location during evaluation: PACU  Patient participation: complete - patient participated  Level of consciousness: awake  Pain score: 4  Airway patency: patent  Nausea & Vomiting: no vomiting and no nausea  Complications: no  Cardiovascular status: hemodynamically stable  Respiratory status: acceptable  Hydration status: stable

## 2021-04-16 NOTE — ANESTHESIA PRE PROCEDURE
Department of Anesthesiology  Preprocedure Note       Name:  Miller Macias   Age:  39 y.o.  :  1975                                          MRN:  [id number]         Date:  2021      Surgeon: Misael Mcallister):  Mariusz Foley MD    Procedure: Procedure(s):  NOVASURE- DILATATION AND CURETTAGE, HYSTEROSCOPY, ENDOMETRIAL ABLATION    Medications prior to admission:   Prior to Admission medications    Medication Sig Start Date End Date Taking? Authorizing Provider   loratadine (CLARITIN) 10 MG tablet Take 10 mg by mouth daily as needed   Yes Historical Provider, MD   Cyanocobalamin (VITAMIN B-12) 5000 MCG TBDP Take 1,000 mcg by mouth daily    Historical Provider, MD   Cholecalciferol (VITAMIN D3 PO) Take 1,000 Units by mouth daily    Historical Provider, MD   FLUoxetine (PROZAC) 20 MG capsule Take 20 mg by mouth daily    Historical Provider, MD   amphetamine-dextroamphetamine (ADDERALL) 10 MG tablet Take 10 mg by mouth daily. Historical Provider, MD   montelukast (SINGULAIR) 10 MG tablet Take 10 mg by mouth nightly    Historical Provider, MD   ALBUTEROL IN Inhale  into the lungs as needed. Historical Provider, MD   fluticasone-salmeterol (ADVAIR DISKUS) 250-50 MCG/DOSE AEPB Inhale 1 puff into the lungs daily. Historical Provider, MD   fish oil-omega-3 fatty acids 1000 MG capsule Take  by mouth daily. Indications: 2 pills once daily pt not sure of the dose    Historical Provider, MD       Current medications:    No current facility-administered medications for this encounter. Allergies: Allergies   Allergen Reactions    Iv Contrast [Iodides] Itching     Given during CT scan. Lips tingled.     Dog Epithelium Allergy Skin Test Rash       Problem List:    Patient Active Problem List   Diagnosis Code    PGDM O24.919    Asthma J45.909       Past Medical History:        Diagnosis Date    Asthma     ALLERGIST. DR. Anna Blank - LAST VISIT 2021    COVID-19 02/15/2021    Gestational diabetes     RESOLVED POST PARTUM    Varicosities     Well adult health check     PCP- DR. Syl Francois - LAST VISIT 2021       Past Surgical History:        Procedure Laterality Date     SECTION      X2    CHOLECYSTECTOMY      DILATION AND CURETTAGE OF UTERUS         Social History:    Social History     Tobacco Use    Smoking status: Never Smoker    Smokeless tobacco: Never Used   Substance Use Topics    Alcohol use: Yes     Comment: RARELY -  ONCE A MONTH                                Counseling given: Not Answered      Vital Signs (Current): There were no vitals filed for this visit. BP Readings from Last 3 Encounters:   21 (!) 146/89   21 122/82   21 110/70       NPO Status:                                                                                 BMI:   Wt Readings from Last 3 Encounters:   21 237 lb (107.5 kg)   21 237 lb (107.5 kg)   20 238 lb (108 kg)     There is no height or weight on file to calculate BMI.    CBC:   Lab Results   Component Value Date    WBC 7.8 2021    RBC 4.74 2021    RBC 3.88 2012    HGB 12.4 2021    HCT 40.1 2021    MCV 84.6 2021    RDW 14.3 2021     2021     2012       CMP:   Lab Results   Component Value Date     2021    K 4.0 2021     2021    CO2 26 2021    BUN 9 2021    CREATININE 0.47 2021    GFRAA >60 2021    LABGLOM >60 2021    GLUCOSE 96 2021    CALCIUM 9.0 2021       POC Tests: No results for input(s): POCGLU, POCNA, POCK, POCCL, POCBUN, POCHEMO, POCHCT in the last 72 hours.     Coags: No results found for: PROTIME, INR, APTT    HCG (If Applicable): No results found for: PREGTESTUR, PREGSERUM, HCG, HCGQUANT     ABGs: No results found for: PHART, PO2ART, DWJ7PQV, OMT3MGZ, BEART, L1LTSPJS     Type & Screen (If Applicable):  No results found for: Brad Ba    Drug/Infectious Status (If Applicable):  No results found for: HIV, HEPCAB    COVID-19 Screening (If Applicable):   Lab Results   Component Value Date    COVID19 DETECTED 02/15/2021           Anesthesia Evaluation    Airway: Mallampati: III  TM distance: >3 FB   Neck ROM: full  Mouth opening: > = 3 FB Dental: normal exam   (+) caps      Pulmonary:   (+) decreased breath sounds,  asthma:                            Cardiovascular:Negative CV ROS                      Neuro/Psych:   Negative Neuro/Psych ROS              GI/Hepatic/Renal: Neg GI/Hepatic/Renal ROS            Endo/Other:    (+) Diabetes, . ROS comment: History of gestational diabetes Abdominal:   (+) obese,         Vascular: negative vascular ROS. Anesthesia Plan      general     ASA 3     (GETA)  Induction: intravenous. MIPS: Postoperative opioids intended. Anesthetic plan and risks discussed with patient. Plan discussed with CRNA.                   Bareden Gomes MD   4/16/2021

## 2021-04-19 LAB — SURGICAL PATHOLOGY REPORT: NORMAL

## 2021-04-20 NOTE — OP NOTE
Operative Note     Pre-operative Diagnosis: Menorrhagia. Dysmenorrhea. History of  section x1. BMI 37.     Post-operative Diagnosis: Same plus septated uterus.     Procedure: EUA. Hysteroscopy, Dilation and Curettage. Hydrothermal endometrial ablation.     Surgeon: Ivonne Pettit MD  Assistants: Brittaney Burdick DO     Anesthesia: General     Indications: This patient is a 45y.o.  3 para 1021 who was seen in the office for regular heavy menstrual cycles with moderately severe cramping. She had been tried on medical therapy with suboptimal results and desired after proper informed consent to proceed with endometrial ablation. .     Findings:   EUA revealed a nongravid sized anteverted uterus. Hysteroscopic findings revealed a narrow uterine cavity with a thick septum noted near the fundus. This was consistent with possible scarring from previous . 2 neuro cavities could be seen approaching the right and left cornua respectively. Procedure Details: The patient was seen in the pre-op room. The risks, benefits, complications, treatment options, and expected outcomes were discussed with the patient. The patient concurred with the proposed plan, giving informed consent. The patient was taken to the Operating Room and identified as Aaron Baker and the procedure was verified. A Time Out was held and the above information confirmed.      After administration of general anesthesia, the patient was placed in the dorsolithotomy position with yellowfin stirrups and examination under general anesthesia performed with findings as noted below. The patient was prepped and draped in the usual sterile fashion. The bladder was drained with a straight catheter, and a weighted speculum was placed into the vagina to visualize the cervix. The cervix was grasped with a single-tooth tenaculum. The uterus and the cervix were sounded and measured 6-8 cm.  The cervix was dilated with hegar dilators to size 6.  6mm size hysteroscope was inserted into the uterine cavity with the above-noted hysteroscopic findings made. Endocervical and endometrial curettings were then obtained and sent for pathology. Due to the findings of a narrow cavity and abnormal anatomy NovaSure was not attempted. A decision was made to proceed with HTA. The HTA was entered up to the internal's and hydrothermal ablation was completed. Hysteroscopy post ablation was with poor visualization but appeared to be adequate. All instruments were then removed. Hemostasis was visualized at the tenaculum site on the cervix.      Instrument, sponge, and needle counts were correct at the conclusion of the case.   SCDs for DVT prophylaxis remain in place for the post operative period.      Total IV fluids 400 mL    Estimated Blood Loss:  10 ml    Drains:  None    Specimens:  Endometrial and endocervical curetting     Instrument and Sponge Count: Correct    Complications: none    Condition: stable, transfer to post anesthesia recoveryy    Jason Caller M.D., [address]  [facility]  4/19/2021, 9:28 PM

## 2021-04-27 ENCOUNTER — OFFICE VISIT (OUTPATIENT)
Dept: OBGYN CLINIC | Age: 46
End: 2021-04-27
Payer: COMMERCIAL

## 2021-04-27 VITALS
HEIGHT: 67 IN | DIASTOLIC BLOOD PRESSURE: 82 MMHG | SYSTOLIC BLOOD PRESSURE: 122 MMHG | WEIGHT: 231 LBS | BODY MASS INDEX: 36.26 KG/M2

## 2021-04-27 DIAGNOSIS — Z98.890 POSTOPERATIVE STATE: Primary | ICD-10-CM

## 2021-04-27 PROCEDURE — 99213 OFFICE O/P EST LOW 20 MIN: CPT | Performed by: OBSTETRICS & GYNECOLOGY

## 2021-04-27 NOTE — PATIENT INSTRUCTIONS
Resume all your normal activities. Return to the office in September for your annual exam or as needed. Have a safe and healthy summer!

## 2021-04-27 NOTE — PROGRESS NOTES
Ciara Caldwell returns today for postop checkup after having an unremarkable D&C, hysteroscopy and HTA on 2021 for menorrhagia/dysmenorrhea. She denies any fever, chills, nausea/vomiting, significant abdominal/pelvic discomfort, vaginal bleeding or UTI symptoms. She's having normal bowel and urinary function and ambulating with no significant difficulty. Operative findings revealed a septated uterus that appeared to be due from scar tissue from prior ? Pathology report shows : .  Surgical Pathology Report 2021  2:[address]  [address]   -- Diagnosis --   CURETTINGS:        -BENIGN ENDOCERVICAL GLAND AND TISSUE FRAGMENTS.      -NO ENDOMETRIUM PRESENT. Joseline Mitchell M.D.   **Electronically Signed Out**         [facility]/2021         Clinical Information   Pre-op Diagnosis:  HEAVY PAINFUL PERIODS, ABNORMAL UTERINE BLEEDING     Operative Findings:  ENDOMETRIAL CURETTINGS   Operation Performed:  NOVASURE  DILATATION AND CURETTAGE HYSTEROSCOPY,   ENDOMETRIAL ABLATION     Source of Specimen   1: ENDOMETRIAL CURETTINGS     Gross Description   \"JUSTIN SMITH, ENDOMETRIAL CURETTINGS\" Blood-soaked square of   Telfa which is scraped to reveal a 1.3 x 1.0 x 0.5 cm aggregate of   multiple red-brown tissue fragments.  Entirely 1cs.  tm       Microscopic Description   Microscopic examination performed. SURGICAL PATHOLOGY CONSULTATION         Patient Name: Leatha Dee [facility] Rec: [id number]   Path Number: [id number]     [employer]          Physical exam      Vitals:    21 1529   BP: 122/82   Site: Right Upper Arm   Position: Sitting   Cuff Size: Large Adult   Weight: 231 lb (104.8 kg)   Height: 5' 7\" (1.702 m)       General appearance: Patient appears to be in no significant distress. Lungs are clear to auscultation in all fields bilaterally without wheezes, rales or rhonchi.   Cardiac exam with normal sinus rhythm and rate without murmurs. Extremities nontender without edema. Assessment/Plan:     ICD-10-CM    1. Postoperative state  Z98.890          Operative findings, operative photos and pathology report discussed with patient. She was instructed to resume her normal activities and return to the office in September for her annual or prn. Navneet Dietrich, [address] [facility]

## 2021-04-28 ENCOUNTER — TELEPHONE (OUTPATIENT)
Dept: OBGYN CLINIC | Age: 46
End: 2021-04-28

## 2021-04-28 NOTE — TELEPHONE ENCOUNTER
Pt called she started having some bleeding after her po visit she is using pads I spoke with [facility] she said to let the pt know this can be normal and to watch the bleeding if it gets worse to contact us she would like to speak to you

## 2021-04-29 NOTE — TELEPHONE ENCOUNTER
Catalina Gasca stated she started bleeding 04/28/21 her last period was she couldn't remember so It may be her periods having cramping told her to push fluids take Motrin and if things do not improve to call the office anythime patient understood

## 2021-07-09 ENCOUNTER — TELEPHONE (OUTPATIENT)
Dept: OBGYN CLINIC | Age: 46
End: 2021-07-09

## 2021-07-12 RX ORDER — IBUPROFEN 800 MG/1
800 TABLET ORAL 2 TIMES DAILY PRN
Qty: 60 TABLET | Refills: 5 | Status: ON HOLD | OUTPATIENT
Start: 2021-07-12 | End: 2021-10-07 | Stop reason: HOSPADM

## 2021-07-13 ENCOUNTER — OFFICE VISIT (OUTPATIENT)
Dept: OBGYN CLINIC | Age: 46
End: 2021-07-13
Payer: COMMERCIAL

## 2021-07-13 VITALS — WEIGHT: 234 LBS | BODY MASS INDEX: 36.65 KG/M2 | DIASTOLIC BLOOD PRESSURE: 72 MMHG | SYSTOLIC BLOOD PRESSURE: 122 MMHG

## 2021-07-13 DIAGNOSIS — N92.1 MENORRHAGIA WITH IRREGULAR CYCLE: ICD-10-CM

## 2021-07-13 DIAGNOSIS — R14.0 ABDOMINAL BLOATING: ICD-10-CM

## 2021-07-13 DIAGNOSIS — N93.9 ABNORMAL UTERINE BLEEDING (AUB): Primary | ICD-10-CM

## 2021-07-13 PROCEDURE — 99213 OFFICE O/P EST LOW 20 MIN: CPT | Performed by: OBSTETRICS & GYNECOLOGY

## 2021-07-13 NOTE — PATIENT INSTRUCTIONS
Please  your prescription for birth control pills and remember not to take the last 7 placebo pills in each pack. Please read the information given to you on intuitive robotic hysterectomy. You may also want to reference the [address] [place] [address] of obstetrics and gynecology or Web MD as well as the intuitive website for more information. Please call the office if you have any questions or concerns. Otherwise return to the office for preop history and physical and scheduling for surgery. Have a healthy summer!

## 2021-07-13 NOTE — PROGRESS NOTES
Analy Garcia returns today after having another heavy menstrual cycle 5 days ago. She states that since her surgery she has continued to have regular, heavy and painful menses. She gets some relief with Motrin 800 mg. She passes large clots and has to double up on her feminine hygiene but still bleeds through it. She denies any fatigue, fever, chills, nausea/vomiting, significant abdominal/pelvic discomfort, significant vaginal bleeding or UTI symptoms. She's having normal bowel and urinary function and ambulating with no difficulty. Vitals:    07/13/21 1542   BP: 122/72      General appearance: Patient appears to be in no significant distress. No vulvar or vaginal lesions. Speculum exam reveals a scant amount of moderate dark blood in vault. Narrow arch and introitus noted. Uterus nongravid and without CMT. Adnexa nontender without abnormal masses bilaterally. Extremities nontender without edema. Assessment/Plan:     Diagnosis Orders   1. Abnormal uterine bleeding (AUB)  US NON OB TRANSVAGINAL    US PELVIS COMPLETE   2. Abdominal bloating  US NON OB TRANSVAGINAL    US PELVIS COMPLETE   3. 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 HTA was performed instead and she has had failed medical therapy prior to this. We discussed definitive surgery i.e. robotic hysterectomy. She is agreeable however her  is training for a new position for the next 9 weeks beginning August 16 and she desires to delay surgery. We also discussed medical management and she was seen and counseled on all forms of birth control. She is aware that hormonal based birth control may increase her risk of developing a blood clot which may increase her morbidity and or mortality.  She was counseled on alternate non hormonal based contraception

## 2021-08-12 ENCOUNTER — TELEPHONE (OUTPATIENT)
Dept: OBGYN CLINIC | Age: 46
End: 2021-08-12

## 2021-08-12 NOTE — TELEPHONE ENCOUNTER
Pt called she wants to discuss some issues with her [facility]

## 2021-08-16 ENCOUNTER — TELEPHONE (OUTPATIENT)
Dept: OBGYN CLINIC | Age: 46
End: 2021-08-16

## 2021-08-16 NOTE — TELEPHONE ENCOUNTER
Called patient she states she is wearing a tampon and panty liner and is changing the tampon every hour or so She states she does not feel weak and just a little tired  Told her so can go to ER to be evaluated but she states she does not have insurance until 09/01/21  Due to  changing jobs  Made appt for her here tomorrow but she will go to er if she is soaking a pad every hour feeling weak or if something else is wrong patient understood

## 2021-08-16 NOTE — TELEPHONE ENCOUNTER
Pt is concerned about her bleeding she is changing pads every hour, blood clots size of her palms, for going on for 13 days now.

## 2021-08-17 ENCOUNTER — OFFICE VISIT (OUTPATIENT)
Dept: OBGYN CLINIC | Age: 46
End: 2021-08-17

## 2021-08-17 VITALS — BODY MASS INDEX: 37.12 KG/M2 | SYSTOLIC BLOOD PRESSURE: 124 MMHG | WEIGHT: 237 LBS | DIASTOLIC BLOOD PRESSURE: 72 MMHG

## 2021-08-17 DIAGNOSIS — N92.1 MENORRHAGIA WITH IRREGULAR CYCLE: Primary | ICD-10-CM

## 2021-08-17 DIAGNOSIS — N94.6 DYSMENORRHEA: ICD-10-CM

## 2021-08-17 PROCEDURE — 99212 OFFICE O/P EST SF 10 MIN: CPT | Performed by: OBSTETRICS & GYNECOLOGY

## 2021-08-17 NOTE — PROGRESS NOTES
[facility] comes to the office today for evaluation of another heavy bleeding episode. She has been on continuous OCPs and was doing fairly well until several days ago. She started not having light bleeding then had an acute episode of heavy bleeding, cramping and passage of large clots. Today she states that she has very light bleeding and has only had to wear a tampon. She has decided to proceed with robotic hysterectomy and ovarian conservation. She is read the information that was previously given to her. Her  is changing jobs and currently she has lost insurance coverage. She plans to have coverage after September 1. Physical exam:  Vitals:    08/17/21 1008   BP: 124/72     Speculum exam reveals a small amount of dark blood and clot in the posterior fornix. No active bleeding from the cervix noted. She was advised to continue with the OCPs and if she should have another acute heavy bleeding episode, will discontinue them and use estrogen only. Also advised to use 1 a day iron supplement. When she has insurance coverage, she will call to have the surgery scheduled and schedule a preoperative appointment in the office. Juana Bolus Sharlotte Apley, MD, mph, FACOG.   [address] [place] [address],[address]

## 2021-08-17 NOTE — PATIENT INSTRUCTIONS
Once you have verified your insurance coverage, please call the office and we can schedule your surgery. Then you will need to have a brief office visit for preoperative history, physical and signing the surgical consent as well as ordering your preoperative blood work.

## 2021-09-09 ENCOUNTER — OFFICE VISIT (OUTPATIENT)
Dept: OBGYN CLINIC | Age: 46
End: 2021-09-09
Payer: COMMERCIAL

## 2021-09-09 VITALS
WEIGHT: 232 LBS | HEIGHT: 67 IN | DIASTOLIC BLOOD PRESSURE: 82 MMHG | BODY MASS INDEX: 36.41 KG/M2 | SYSTOLIC BLOOD PRESSURE: 122 MMHG

## 2021-09-09 DIAGNOSIS — N93.9 ABNORMAL UTERINE BLEEDING (AUB): Primary | ICD-10-CM

## 2021-09-09 DIAGNOSIS — R14.0 ABDOMINAL BLOATING: ICD-10-CM

## 2021-09-09 DIAGNOSIS — Z98.890 POSTOPERATIVE STATE: ICD-10-CM

## 2021-09-09 DIAGNOSIS — Z98.891 HISTORY OF 2 CESAREAN SECTIONS: ICD-10-CM

## 2021-09-09 PROCEDURE — 99214 OFFICE O/P EST MOD 30 MIN: CPT | Performed by: OBSTETRICS & GYNECOLOGY

## 2021-09-09 NOTE — PROGRESS NOTES
DATE OF VISIT:  21        History and Physical    Nicky Horton    :  1975  CHIEF COMPLAINT:    Chief Complaint   Patient presents with    Pre-op Exam     Here to discuss Hyst                     HPI :   Nicky Horton is a 39 y.o. femaleGRAVIDA 3 PARA 2012  section    Nicky Horton returns today to discuss GYN surgery. She states that since her surgery she has continued to have regular, heavy and painful menses. She gets some relief with Motrin 800 mg. She passes large clots and has to double up on her feminine hygiene but still bleeds through it. She denies any fatigue, fever, chills, nausea/vomiting, significant abdominal/pelvic discomfort, significant vaginal bleeding or UTI symptoms. She's having normal bowel and urinary function and ambulating with no difficulty. 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 HTA was performed instead and she has had failed medical therapy prior to this. We discussed definitive surgery i.e. robotic hysterectomy. She is agreeable     She is here to discuss scheduling a robotic assisted vaginal hysterectomy, possible abdominal hysterectomy with removal of both tubes. Possible cystoscopy secondary to    Diagnosis Orders   1. Abnormal uterine bleeding (AUB)     2. Abdominal bloating     3. D&C, hysteroscopy, HTA ablation 21      Patient education was done regarding her diagnosis and management. She does desire to proceed with the intended procedure.   She is otherwise without any significant complaints.  _____________________________________________________________________  Past Medical History:   Diagnosis Date    Asthma     ALLERGIST. DR. Claude Maloney - LAST VISIT 2021    COVID-19 02/15/2021    Gestational diabetes     RESOLVED POST PARTUM    Varicosities     Well adult health check     PCP- DR. Nallely Ruiz - LAST VISIT 2021                                                                   Past Surgical History:   Procedure Laterality Date     SECTION      X2    CHOLECYSTECTOMY      DILATION AND CURETTAGE OF UTERUS      DILATION AND CURETTAGE OF UTERUS  2021    Hysteroscopy, Endometrial ablation    DILATION AND CURETTAGE OF UTERUS N/A 2021    HYDROTHERMAL - DILATATION AND CURETTAGE, HYSTEROSCOPY, ENDOMETRIAL ABLATION performed by Miles Negro MD at [address] [place] [address] History   Problem Relation Age of Onset    Diabetes Brother      Social History     Tobacco Use   Smoking Status Never Smoker   Smokeless Tobacco Never Used     Social History     Substance and Sexual Activity   Alcohol Use Yes    Comment: RARELY -  ONCE A MONTH     Current Outpatient Medications   Medication Sig Dispense Refill    norethindrone-ethinyl estradiol (OVCON-35, 28,) 0.4-35 MG-MCG per tablet Take 1 tablet by mouth daily Do not take the last 7 placebo pills in each pack 3 packet 1    ibuprofen (ADVIL;MOTRIN) 600 MG tablet Take 1 tablet by mouth every 6 hours 60 tablet 1    ondansetron (ZOFRAN ODT) 4 MG disintegrating tablet Take 1 tablet by mouth every 8 hours as needed for Nausea or Vomiting 15 tablet 0    Cyanocobalamin (VITAMIN B-12) 5000 MCG TBDP Take 1,000 mcg by mouth daily      Cholecalciferol (VITAMIN D3 PO) Take 1,000 Units by mouth daily      loratadine (CLARITIN) 10 MG tablet Take 10 mg by mouth daily as needed      FLUoxetine (PROZAC) 20 MG capsule Take 20 mg by mouth daily      amphetamine-dextroamphetamine (ADDERALL) 10 MG tablet Take 10 mg by mouth daily.  montelukast (SINGULAIR) 10 MG tablet Take 10 mg by mouth nightly      ALBUTEROL IN Inhale  into the lungs as needed.  fluticasone-salmeterol (ADVAIR DISKUS) 250-50 MCG/DOSE AEPB Inhale 1 puff into the lungs daily.         ibuprofen (ADVIL;MOTRIN) 800 MG tablet Take 1 tablet by mouth 2 times daily as needed for Pain 60 tablet 5    fish oil-omega-3 fatty acids 1000 MG capsule Take  by mouth daily. Indications: 2 pills once daily pt not sure of the dose       No current facility-administered medications for this visit. Allergies: Allergies   Allergen Reactions    Iv Contrast [Iodides] Itching     Given during CT scan. Lips tingled.  Dog Epithelium Allergy Skin Test Rash       Gynecologic History:  Patient's last menstrual period was 2021. Sexually Active: Yes  STD History: No  Abnormal Pap History no  Birth Control: Yes, OCPs/shantelle    OB History    Para Term  AB Living   3 1 1 0 1 2   SAB TAB Ectopic Molar Multiple Live Births   0 0 0 0 0 1     ______________________________________________________________________  REVIEW OF SYSTEMS:        Constitutional:  Unexpected weight change no  Neurological:  Frequent headaches  no  Ophthalmic:  Recent visual changes no  ENT:   Difficulty swallowing  no  Breast:              Masses   no     Respiratory:  Shortness of breath  no    Cardiovascular: Chest pain   no     Gastrointestinal: Chronic diarrhea/constipation no   Urogenital:  Urinary incontinence  no                                         Heavy/irregular periods           yes                                      Vaginal discharge                   no  Hematological: Bruises easy   no     Endocrine:  Nipple Discharge  no     Hot/Cold Intolerance  no   Psychological:  Mood and affect were wnl yes                                                                                                                                           Physical Exam:    Vitals:    21 1436   BP: 122/82   Site: Right Upper Arm   Position: Sitting   Cuff Size: Large Adult   Weight: 232 lb (105.2 kg)   Height: 5' 7\" (1.702 m)       General Appearance:  She does not appear to be in any distress. This  is a well developed, well nourished, well groomed female.         Neurological:  The patient is alert and oriented to time, place, person, and situation without any noted sensory motor deficits. Skin:  A brief inspection of the skin revealed no rashes or lesions. Neck:  The neck was supple. There is no tracheal deviation, thyromegaly or supraclavicular adenopathy appreciated. Respiratory: There was unlabored respiratory effort. Lungs clear to ascultation without wheezes, rales or rhonchi in all fields bilaterally. Cardiovascular:  Normal sinus rhythm with a regular rate and without murmur, rubs or gallops. Abdomen: The abdomen was soft and non-tender with no guarding, rebound, CVAT or rigidity. No hernias were appreciated. Bowel sounds were normally active. Pelvic exam: For EUA    Extremities:  FROM and nontender without clubbing cyanosis or edema. ASSESSMENT:      Diagnosis Orders   1. Abnormal uterine bleeding (AUB)     2. Abdominal bloating     3. D&C, hysteroscopy, HTA ablation 21     4. History of 2  sections                     PLAN:    Proper informed consent was done, alternatives were discussed   and she understands the surgical risks to the proposed procedure including but not limited to: infection, hemorrhage, blood clot formation, damage to the gastrointestinal/genital urinary/neurological/vascular systems, death and failure in the proposed procedure's intent. She also understands the risks from transfusion including but not limited to: fever, allergic reaction, hepatitis B/C. and HIV. All her questions have been answered to her satisfaction. The consent has been signed for a robotic assisted, possible abdominal hysterectomy with removal of both tubes. Possible cystoscopy. Preop labs will include a CBC, type & screen, HCG and BMP. We will use Ancef 2 g IVPB for antibiotic prophylaxis and Lovenox 40 mg subcutaneous for VTE prophylaxis. She was instructed not to use NSAIDs regularly within 4-5 days of her planned surgery.   She will plan on returning to the office in 1-2 weeks postop. Yash Crowder MD, MPH, [facility]. [facility]  [place]

## 2021-09-09 NOTE — PATIENT INSTRUCTIONS
Please carefully follow all the preoperative instructions given to you. Please call the office you have if you have any questions or concerns before or after surgery. Plan on returning to the office 7 to 10 days from surgery.

## 2021-09-21 RX ORDER — SODIUM CHLORIDE, SODIUM LACTATE, POTASSIUM CHLORIDE, CALCIUM CHLORIDE 600; 310; 30; 20 MG/100ML; MG/100ML; MG/100ML; MG/100ML
1000 INJECTION, SOLUTION INTRAVENOUS CONTINUOUS
Status: CANCELLED | OUTPATIENT
Start: 2021-09-21

## 2021-09-23 ENCOUNTER — HOSPITAL ENCOUNTER (OUTPATIENT)
Dept: PREADMISSION TESTING | Age: 46
Discharge: HOME | End: 2021-09-27
Payer: COMMERCIAL

## 2021-09-23 VITALS
DIASTOLIC BLOOD PRESSURE: 91 MMHG | TEMPERATURE: 97.2 F | BODY MASS INDEX: 38.25 KG/M2 | SYSTOLIC BLOOD PRESSURE: 141 MMHG | WEIGHT: 238 LBS | HEIGHT: 66 IN | OXYGEN SATURATION: 98 % | HEART RATE: 100 BPM | RESPIRATION RATE: 18 BRPM

## 2021-09-23 LAB
ABO/RH: NORMAL
ANION GAP SERPL CALCULATED.3IONS-SCNC: 14 MMOL/L (ref 9–17)
ANTIBODY SCREEN: NEGATIVE
ARM BAND NUMBER: NORMAL
BUN BLDV-MCNC: 8 MG/DL (ref 6–20)
BUN/CREAT BLD: NORMAL (ref 9–20)
CALCIUM SERPL-MCNC: 9.2 MG/DL (ref 8.6–10.4)
CHLORIDE BLD-SCNC: 100 MMOL/L (ref 98–107)
CO2: 24 MMOL/L (ref 20–31)
CREAT SERPL-MCNC: 0.52 MG/DL (ref 0.5–0.9)
EXPIRATION DATE: NORMAL
GFR AFRICAN AMERICAN: >60 ML/MIN
GFR NON-AFRICAN AMERICAN: >60 ML/MIN
GFR SERPL CREATININE-BSD FRML MDRD: NORMAL ML/MIN/{1.73_M2}
GFR SERPL CREATININE-BSD FRML MDRD: NORMAL ML/MIN/{1.73_M2}
GLUCOSE BLD-MCNC: 99 MG/DL (ref 70–99)
HCT VFR BLD CALC: 40.9 % (ref 36.3–47.1)
HEMOGLOBIN: 12.6 G/DL (ref 11.9–15.1)
MCH RBC QN AUTO: 25.9 PG (ref 25.2–33.5)
MCHC RBC AUTO-ENTMCNC: 30.8 G/DL (ref 28.4–34.8)
MCV RBC AUTO: 84 FL (ref 82.6–102.9)
NRBC AUTOMATED: 0 PER 100 WBC
PDW BLD-RTO: 14.9 % (ref 11.8–14.4)
PLATELET # BLD: 405 K/UL (ref 138–453)
PMV BLD AUTO: 8.8 FL (ref 8.1–13.5)
POTASSIUM SERPL-SCNC: 4.3 MMOL/L (ref 3.7–5.3)
RBC # BLD: 4.87 M/UL (ref 3.95–5.11)
SODIUM BLD-SCNC: 138 MMOL/L (ref 135–144)
WBC # BLD: 9 K/UL (ref 3.5–11.3)

## 2021-09-23 PROCEDURE — 36415 COLL VENOUS BLD VENIPUNCTURE: CPT

## 2021-09-23 PROCEDURE — 86850 RBC ANTIBODY SCREEN: CPT

## 2021-09-23 PROCEDURE — 85027 COMPLETE CBC AUTOMATED: CPT

## 2021-09-23 PROCEDURE — 93005 ELECTROCARDIOGRAM TRACING: CPT | Performed by: ANESTHESIOLOGY

## 2021-09-23 PROCEDURE — 86901 BLOOD TYPING SEROLOGIC RH(D): CPT

## 2021-09-23 PROCEDURE — 80048 BASIC METABOLIC PNL TOTAL CA: CPT

## 2021-09-23 PROCEDURE — 86900 BLOOD TYPING SEROLOGIC ABO: CPT

## 2021-09-23 RX ORDER — FLUTICASONE FUROATE 200 UG/1
1 POWDER RESPIRATORY (INHALATION) DAILY
COMMUNITY
Start: 2021-08-04

## 2021-09-23 RX ORDER — DEXTROAMPHETAMINE/AMPHETAMINE 10 MG
10 CAPSULE, EXT RELEASE 24 HR ORAL DAILY PRN
COMMUNITY
Start: 2021-09-07

## 2021-09-23 NOTE — PROGRESS NOTES
Anesthesia Focused Assessment    Hx of anesthesia complications:  no  Family hx of anesthesia complications:  no      Prior + Covid-19 test? yes  Date: 2/2021  Symptoms: none  Complications: none  Hospitalization required? No, tested prior to ablation      STOP-BANG Sleep Apnea Questionnaire    SNORE loudly (heard through closed doors)? No  TIRED, fatigued, sleepy during daytime? No  OBSERVED stopping breathing during sleep? No  High blood PRESSURE or being treated? No    BMI over 35? Yes  AGE over 48? No  NECK circumference over 16\"? No  GENDER (male)? No             Total 1  High risk 5-8  Intermediate risk 3-4  Low risk 0-2    ----------------------------------------------------------------------------------------------------------------------  MARY                              No  If yes, machine? DM1                                            No  DM2                   No    Coronary Artery Disease      No  HTN         No  Defib/AICD/Pacemaker               No             Renal Failure                   No  If yes, on dialysis           Active smoker? No  Drinks alcohol? Yes, rarely  Illicit drugs? No  Dentition?        benign      Past Medical History:   Diagnosis Date    ADHD     Asthma     ALLERGIST. DR. Duarte Prior - LAST VISIT 2/5/2021    COVID-19 02/15/2021    no symptoms    Depression     Under care of team 9/23/20214    allergist-Dr Miller[facility]-last visit aug 2021    Varicosities     Well adult health check 09/23/2021    PCP- DR. Giovany Aguilar -[facility]- LAST VISIT aug 2021         Patient was evaluated in PAT & anesthesia guidelines were applied. NPO guidelines, medication instructions and scheduled arrival time were reviewed with patient.                                                                                                                      Anesthesia contacted:   Yes    I spoke with Dr. Carolyn Isbell. Patient

## 2021-09-24 ENCOUNTER — TELEPHONE (OUTPATIENT)
Dept: OBGYN CLINIC | Age: 46
End: 2021-09-24

## 2021-09-24 LAB
EKG ATRIAL RATE: 101 BPM
EKG P AXIS: 71 DEGREES
EKG P-R INTERVAL: 182 MS
EKG Q-T INTERVAL: 390 MS
EKG QRS DURATION: 98 MS
EKG QTC CALCULATION (BAZETT): 505 MS
EKG R AXIS: -75 DEGREES
EKG T AXIS: 62 DEGREES
EKG VENTRICULAR RATE: 101 BPM

## 2021-09-24 PROCEDURE — 84703 CHORIONIC GONADOTROPIN ASSAY: CPT

## 2021-09-24 NOTE — TELEPHONE ENCOUNTER
Shayna Rose from surgery scheduling called about this pt. They did not do a  hcg serum and wanted to know if a urine hcg was ok to do day of surgery or do you need them to do a blood hcg prior to her surgery, Call Shayna Rose at [phone].

## 2021-09-25 LAB — HCG QUALITATIVE: NEGATIVE

## 2021-10-07 ENCOUNTER — HOSPITAL ENCOUNTER (OUTPATIENT)
Age: 46
Setting detail: OUTPATIENT SURGERY
Discharge: HOME | End: 2021-10-07
Attending: OBSTETRICS & GYNECOLOGY | Admitting: OBSTETRICS & GYNECOLOGY
Payer: COMMERCIAL

## 2021-10-07 ENCOUNTER — ANESTHESIA EVENT (OUTPATIENT)
Dept: OPERATING ROOM | Age: 46
End: 2021-10-07
Payer: COMMERCIAL

## 2021-10-07 ENCOUNTER — ANESTHESIA (OUTPATIENT)
Dept: OPERATING ROOM | Age: 46
End: 2021-10-07
Payer: COMMERCIAL

## 2021-10-07 VITALS — SYSTOLIC BLOOD PRESSURE: 103 MMHG | OXYGEN SATURATION: 100 % | TEMPERATURE: 97.4 F | DIASTOLIC BLOOD PRESSURE: 89 MMHG

## 2021-10-07 VITALS
SYSTOLIC BLOOD PRESSURE: 133 MMHG | HEIGHT: 66 IN | HEART RATE: 96 BPM | BODY MASS INDEX: 38.25 KG/M2 | OXYGEN SATURATION: 96 % | RESPIRATION RATE: 18 BRPM | WEIGHT: 238 LBS | DIASTOLIC BLOOD PRESSURE: 97 MMHG | TEMPERATURE: 97.3 F

## 2021-10-07 DIAGNOSIS — G89.18 POST-OP PAIN: Primary | ICD-10-CM

## 2021-10-07 PROBLEM — Z90.710 HISTORY OF ROBOT-ASSISTED LAPAROSCOPIC HYSTERECTOMY: Status: ACTIVE | Noted: 2021-10-07

## 2021-10-07 LAB — HCG, PREGNANCY URINE (POC): NEGATIVE

## 2021-10-07 PROCEDURE — 7100000041 HC SPAR PHASE II RECOVERY - ADDTL 15 MIN: Performed by: OBSTETRICS & GYNECOLOGY

## 2021-10-07 PROCEDURE — 6360000002 HC RX W HCPCS: Performed by: OBSTETRICS & GYNECOLOGY

## 2021-10-07 PROCEDURE — C1760 CLOSURE DEV, VASC: HCPCS | Performed by: OBSTETRICS & GYNECOLOGY

## 2021-10-07 PROCEDURE — S2900 ROBOTIC SURGICAL SYSTEM: HCPCS | Performed by: OBSTETRICS & GYNECOLOGY

## 2021-10-07 PROCEDURE — 7100000000 HC PACU RECOVERY - FIRST 15 MIN: Performed by: OBSTETRICS & GYNECOLOGY

## 2021-10-07 PROCEDURE — 3700000000 HC ANESTHESIA ATTENDED CARE: Performed by: OBSTETRICS & GYNECOLOGY

## 2021-10-07 PROCEDURE — 58552 LAPARO-VAG HYST INCL T/O: CPT | Performed by: OBSTETRICS & GYNECOLOGY

## 2021-10-07 PROCEDURE — 3600000019 HC SURGERY ROBOT ADDTL 15MIN: Performed by: OBSTETRICS & GYNECOLOGY

## 2021-10-07 PROCEDURE — 2580000003 HC RX 258: Performed by: ANESTHESIOLOGY

## 2021-10-07 PROCEDURE — 2580000003 HC RX 258: Performed by: NURSE ANESTHETIST, CERTIFIED REGISTERED

## 2021-10-07 PROCEDURE — 81025 URINE PREGNANCY TEST: CPT

## 2021-10-07 PROCEDURE — 88307 TISSUE EXAM BY PATHOLOGIST: CPT

## 2021-10-07 PROCEDURE — 2500000003 HC RX 250 WO HCPCS: Performed by: NURSE ANESTHETIST, CERTIFIED REGISTERED

## 2021-10-07 PROCEDURE — 3700000001 HC ADD 15 MINUTES (ANESTHESIA): Performed by: OBSTETRICS & GYNECOLOGY

## 2021-10-07 PROCEDURE — 6370000000 HC RX 637 (ALT 250 FOR IP): Performed by: ANESTHESIOLOGY

## 2021-10-07 PROCEDURE — 7100000001 HC PACU RECOVERY - ADDTL 15 MIN: Performed by: OBSTETRICS & GYNECOLOGY

## 2021-10-07 PROCEDURE — 2580000003 HC RX 258: Performed by: OBSTETRICS & GYNECOLOGY

## 2021-10-07 PROCEDURE — 7100000040 HC SPAR PHASE II RECOVERY - FIRST 15 MIN: Performed by: OBSTETRICS & GYNECOLOGY

## 2021-10-07 PROCEDURE — 6360000002 HC RX W HCPCS: Performed by: ANESTHESIOLOGY

## 2021-10-07 PROCEDURE — 2709999900 HC NON-CHARGEABLE SUPPLY: Performed by: OBSTETRICS & GYNECOLOGY

## 2021-10-07 PROCEDURE — 3600000009 HC SURGERY ROBOT BASE: Performed by: OBSTETRICS & GYNECOLOGY

## 2021-10-07 PROCEDURE — 6360000002 HC RX W HCPCS: Performed by: NURSE ANESTHETIST, CERTIFIED REGISTERED

## 2021-10-07 RX ORDER — FENTANYL CITRATE 50 UG/ML
INJECTION, SOLUTION INTRAMUSCULAR; INTRAVENOUS PRN
Status: DISCONTINUED | OUTPATIENT
Start: 2021-10-07 | End: 2021-10-07 | Stop reason: SDUPTHER

## 2021-10-07 RX ORDER — ONDANSETRON 2 MG/ML
INJECTION INTRAMUSCULAR; INTRAVENOUS PRN
Status: DISCONTINUED | OUTPATIENT
Start: 2021-10-07 | End: 2021-10-07 | Stop reason: SDUPTHER

## 2021-10-07 RX ORDER — HYDROCODONE BITARTRATE AND ACETAMINOPHEN 5; 325 MG/1; MG/1
1 TABLET ORAL PRN
Status: COMPLETED | OUTPATIENT
Start: 2021-10-07 | End: 2021-10-07

## 2021-10-07 RX ORDER — MIDAZOLAM HYDROCHLORIDE 2 MG/2ML
2 INJECTION, SOLUTION INTRAMUSCULAR; INTRAVENOUS ONCE
Status: COMPLETED | OUTPATIENT
Start: 2021-10-07 | End: 2021-10-07

## 2021-10-07 RX ORDER — ONDANSETRON 4 MG/1
4 TABLET, FILM COATED ORAL EVERY 8 HOURS PRN
Qty: 20 TABLET | Refills: 0 | Status: SHIPPED | OUTPATIENT
Start: 2021-10-07

## 2021-10-07 RX ORDER — SODIUM CHLORIDE, SODIUM LACTATE, POTASSIUM CHLORIDE, CALCIUM CHLORIDE 600; 310; 30; 20 MG/100ML; MG/100ML; MG/100ML; MG/100ML
1000 INJECTION, SOLUTION INTRAVENOUS CONTINUOUS
Status: DISCONTINUED | OUTPATIENT
Start: 2021-10-07 | End: 2021-10-07 | Stop reason: HOSPADM

## 2021-10-07 RX ORDER — FENTANYL CITRATE 50 UG/ML
100 INJECTION, SOLUTION INTRAMUSCULAR; INTRAVENOUS ONCE
Status: COMPLETED | OUTPATIENT
Start: 2021-10-07 | End: 2021-10-07

## 2021-10-07 RX ORDER — ROCURONIUM BROMIDE 10 MG/ML
INJECTION, SOLUTION INTRAVENOUS PRN
Status: DISCONTINUED | OUTPATIENT
Start: 2021-10-07 | End: 2021-10-07 | Stop reason: SDUPTHER

## 2021-10-07 RX ORDER — IBUPROFEN 600 MG/1
600 TABLET ORAL EVERY 6 HOURS PRN
Qty: 60 TABLET | Refills: 1 | Status: SHIPPED | OUTPATIENT
Start: 2021-10-07 | End: 2021-11-06

## 2021-10-07 RX ORDER — SODIUM CHLORIDE, SODIUM LACTATE, POTASSIUM CHLORIDE, CALCIUM CHLORIDE 600; 310; 30; 20 MG/100ML; MG/100ML; MG/100ML; MG/100ML
INJECTION, SOLUTION INTRAVENOUS CONTINUOUS PRN
Status: DISCONTINUED | OUTPATIENT
Start: 2021-10-07 | End: 2021-10-07 | Stop reason: SDUPTHER

## 2021-10-07 RX ORDER — SIMETHICONE 80 MG
80 TABLET,CHEWABLE ORAL 4 TIMES DAILY PRN
Qty: 60 TABLET | Refills: 1 | Status: SHIPPED | OUTPATIENT
Start: 2021-10-07

## 2021-10-07 RX ORDER — KETOROLAC TROMETHAMINE 30 MG/ML
INJECTION, SOLUTION INTRAMUSCULAR; INTRAVENOUS PRN
Status: DISCONTINUED | OUTPATIENT
Start: 2021-10-07 | End: 2021-10-07 | Stop reason: SDUPTHER

## 2021-10-07 RX ORDER — MAGNESIUM HYDROXIDE 1200 MG/15ML
LIQUID ORAL CONTINUOUS PRN
Status: COMPLETED | OUTPATIENT
Start: 2021-10-07 | End: 2021-10-07

## 2021-10-07 RX ORDER — DEXAMETHASONE SODIUM PHOSPHATE 10 MG/ML
INJECTION INTRAMUSCULAR; INTRAVENOUS PRN
Status: DISCONTINUED | OUTPATIENT
Start: 2021-10-07 | End: 2021-10-07 | Stop reason: SDUPTHER

## 2021-10-07 RX ORDER — BUPIVACAINE HYDROCHLORIDE 5 MG/ML
50 INJECTION, SOLUTION EPIDURAL; INTRACAUDAL ONCE
Status: DISCONTINUED | OUTPATIENT
Start: 2021-10-07 | End: 2021-10-07 | Stop reason: HOSPADM

## 2021-10-07 RX ORDER — MAGNESIUM HYDROXIDE 1200 MG/15ML
LIQUID ORAL PRN
Status: DISCONTINUED | OUTPATIENT
Start: 2021-10-07 | End: 2021-10-07 | Stop reason: ALTCHOICE

## 2021-10-07 RX ORDER — DOCUSATE SODIUM 100 MG/1
100 CAPSULE, LIQUID FILLED ORAL 2 TIMES DAILY PRN
Qty: 60 CAPSULE | Refills: 0 | Status: SHIPPED | OUTPATIENT
Start: 2021-10-07 | End: 2022-01-31

## 2021-10-07 RX ORDER — GLYCOPYRROLATE 1 MG/5 ML
SYRINGE (ML) INTRAVENOUS PRN
Status: DISCONTINUED | OUTPATIENT
Start: 2021-10-07 | End: 2021-10-07 | Stop reason: SDUPTHER

## 2021-10-07 RX ORDER — PROPOFOL 10 MG/ML
INJECTION, EMULSION INTRAVENOUS PRN
Status: DISCONTINUED | OUTPATIENT
Start: 2021-10-07 | End: 2021-10-07 | Stop reason: SDUPTHER

## 2021-10-07 RX ORDER — LIDOCAINE HYDROCHLORIDE 10 MG/ML
INJECTION, SOLUTION EPIDURAL; INFILTRATION; INTRACAUDAL; PERINEURAL PRN
Status: DISCONTINUED | OUTPATIENT
Start: 2021-10-07 | End: 2021-10-07 | Stop reason: SDUPTHER

## 2021-10-07 RX ORDER — HYDROCODONE BITARTRATE AND ACETAMINOPHEN 5; 325 MG/1; MG/1
2 TABLET ORAL PRN
Status: COMPLETED | OUTPATIENT
Start: 2021-10-07 | End: 2021-10-07

## 2021-10-07 RX ORDER — HYDROCODONE BITARTRATE AND ACETAMINOPHEN 5; 325 MG/1; MG/1
1 TABLET ORAL EVERY 6 HOURS PRN
Qty: 28 TABLET | Refills: 0 | Status: SHIPPED | OUTPATIENT
Start: 2021-10-07 | End: 2021-10-14

## 2021-10-07 RX ADMIN — CEFAZOLIN 2000 MG: 10 INJECTION, POWDER, FOR SOLUTION INTRAVENOUS at 08:46

## 2021-10-07 RX ADMIN — FENTANYL CITRATE 50 MCG: 50 INJECTION, SOLUTION INTRAMUSCULAR; INTRAVENOUS at 09:06

## 2021-10-07 RX ADMIN — FENTANYL CITRATE 50 MCG: 50 INJECTION, SOLUTION INTRAMUSCULAR; INTRAVENOUS at 08:39

## 2021-10-07 RX ADMIN — HYDROMORPHONE HYDROCHLORIDE 0.5 MG: 1 INJECTION, SOLUTION INTRAMUSCULAR; INTRAVENOUS; SUBCUTANEOUS at 12:24

## 2021-10-07 RX ADMIN — ENOXAPARIN SODIUM 40 MG: 40 INJECTION SUBCUTANEOUS at 07:45

## 2021-10-07 RX ADMIN — Medication 0.4 MG: at 09:13

## 2021-10-07 RX ADMIN — ROCURONIUM BROMIDE 50 MG: 10 INJECTION INTRAVENOUS at 08:39

## 2021-10-07 RX ADMIN — ONDANSETRON 4 MG: 2 INJECTION, SOLUTION INTRAMUSCULAR; INTRAVENOUS at 11:31

## 2021-10-07 RX ADMIN — DEXAMETHASONE SODIUM PHOSPHATE 10 MG: 10 INJECTION INTRAMUSCULAR; INTRAVENOUS at 08:58

## 2021-10-07 RX ADMIN — FENTANYL CITRATE 50 MCG: 50 INJECTION, SOLUTION INTRAMUSCULAR; INTRAVENOUS at 11:26

## 2021-10-07 RX ADMIN — ROCURONIUM BROMIDE 10 MG: 10 INJECTION INTRAVENOUS at 10:36

## 2021-10-07 RX ADMIN — PHENYLEPHRINE HYDROCHLORIDE 100 MCG: 10 INJECTION INTRAVENOUS at 09:33

## 2021-10-07 RX ADMIN — HYDROMORPHONE HYDROCHLORIDE 0.5 MG: 1 INJECTION, SOLUTION INTRAMUSCULAR; INTRAVENOUS; SUBCUTANEOUS at 12:41

## 2021-10-07 RX ADMIN — SODIUM CHLORIDE, POTASSIUM CHLORIDE, SODIUM LACTATE AND CALCIUM CHLORIDE: 600; 310; 30; 20 INJECTION, SOLUTION INTRAVENOUS at 08:32

## 2021-10-07 RX ADMIN — ROCURONIUM BROMIDE 20 MG: 10 INJECTION INTRAVENOUS at 10:01

## 2021-10-07 RX ADMIN — SUGAMMADEX 200 MG: 100 INJECTION, SOLUTION INTRAVENOUS at 11:41

## 2021-10-07 RX ADMIN — KETOROLAC TROMETHAMINE 30 MG: 30 INJECTION, SOLUTION INTRAMUSCULAR at 11:26

## 2021-10-07 RX ADMIN — FENTANYL CITRATE 50 MCG: 50 INJECTION, SOLUTION INTRAMUSCULAR; INTRAVENOUS at 11:52

## 2021-10-07 RX ADMIN — SODIUM CHLORIDE, SODIUM LACTATE, POTASSIUM CHLORIDE, CALCIUM CHLORIDE: 600; 310; 30; 20 INJECTION, SOLUTION INTRAVENOUS at 08:46

## 2021-10-07 RX ADMIN — LIDOCAINE HYDROCHLORIDE 50 MG: 10 INJECTION, SOLUTION EPIDURAL; INFILTRATION; INTRACAUDAL; PERINEURAL at 08:39

## 2021-10-07 RX ADMIN — ROCURONIUM BROMIDE 20 MG: 10 INJECTION INTRAVENOUS at 09:11

## 2021-10-07 RX ADMIN — PROPOFOL 200 MG: 10 INJECTION, EMULSION INTRAVENOUS at 08:39

## 2021-10-07 RX ADMIN — ROCURONIUM BROMIDE 20 MG: 10 INJECTION INTRAVENOUS at 09:36

## 2021-10-07 RX ADMIN — HYDROCODONE BITARTRATE AND ACETAMINOPHEN 2 TABLET: 5; 325 TABLET ORAL at 12:56

## 2021-10-07 RX ADMIN — HYDROMORPHONE HYDROCHLORIDE 0.5 MG: 1 INJECTION, SOLUTION INTRAMUSCULAR; INTRAVENOUS; SUBCUTANEOUS at 12:33

## 2021-10-07 RX ADMIN — SODIUM CHLORIDE, POTASSIUM CHLORIDE, SODIUM LACTATE AND CALCIUM CHLORIDE: 600; 310; 30; 20 INJECTION, SOLUTION INTRAVENOUS at 10:01

## 2021-10-07 RX ADMIN — FENTANYL CITRATE 50 MCG: 50 INJECTION, SOLUTION INTRAMUSCULAR; INTRAVENOUS at 09:24

## 2021-10-07 RX ADMIN — FENTANYL CITRATE 100 MCG: 50 INJECTION INTRAMUSCULAR; INTRAVENOUS at 08:12

## 2021-10-07 RX ADMIN — MIDAZOLAM HYDROCHLORIDE 2 MG: 1 INJECTION, SOLUTION INTRAMUSCULAR; INTRAVENOUS at 08:12

## 2021-10-07 RX ADMIN — SODIUM CHLORIDE, POTASSIUM CHLORIDE, SODIUM LACTATE AND CALCIUM CHLORIDE 1000 ML: 600; 310; 30; 20 INJECTION, SOLUTION INTRAVENOUS at 07:45

## 2021-10-07 ASSESSMENT — PAIN SCALES - GENERAL
PAINLEVEL_OUTOF10: 8
PAINLEVEL_OUTOF10: 7
PAINLEVEL_OUTOF10: 8
PAINLEVEL_OUTOF10: 4
PAINLEVEL_OUTOF10: 3
PAINLEVEL_OUTOF10: 7
PAINLEVEL_OUTOF10: 8
PAINLEVEL_OUTOF10: 4
PAINLEVEL_OUTOF10: 8
PAINLEVEL_OUTOF10: 8
PAINLEVEL_OUTOF10: 4
PAINLEVEL_OUTOF10: 2
PAINLEVEL_OUTOF10: 4

## 2021-10-07 ASSESSMENT — PULMONARY FUNCTION TESTS
PIF_VALUE: 36
PIF_VALUE: 38
PIF_VALUE: 31
PIF_VALUE: 22
PIF_VALUE: 22
PIF_VALUE: 3
PIF_VALUE: 1
PIF_VALUE: 38
PIF_VALUE: 38
PIF_VALUE: 39
PIF_VALUE: 38
PIF_VALUE: 39
PIF_VALUE: 19
PIF_VALUE: 21
PIF_VALUE: 36
PIF_VALUE: 38
PIF_VALUE: 31
PIF_VALUE: 21
PIF_VALUE: 3
PIF_VALUE: 38
PIF_VALUE: 5
PIF_VALUE: 37
PIF_VALUE: 39
PIF_VALUE: 37
PIF_VALUE: 1
PIF_VALUE: 32
PIF_VALUE: 18
PIF_VALUE: 38
PIF_VALUE: 32
PIF_VALUE: 28
PIF_VALUE: 31
PIF_VALUE: 39
PIF_VALUE: 18
PIF_VALUE: 20
PIF_VALUE: 21
PIF_VALUE: 38
PIF_VALUE: 38
PIF_VALUE: 22
PIF_VALUE: 39
PIF_VALUE: 38
PIF_VALUE: 36
PIF_VALUE: 38
PIF_VALUE: 15
PIF_VALUE: 36
PIF_VALUE: 38
PIF_VALUE: 0
PIF_VALUE: 17
PIF_VALUE: 38
PIF_VALUE: 21
PIF_VALUE: 22
PIF_VALUE: 40
PIF_VALUE: 39
PIF_VALUE: 38
PIF_VALUE: 38
PIF_VALUE: 37
PIF_VALUE: 6
PIF_VALUE: 40
PIF_VALUE: 39
PIF_VALUE: 21
PIF_VALUE: 39
PIF_VALUE: 22
PIF_VALUE: 21
PIF_VALUE: 38
PIF_VALUE: 31
PIF_VALUE: 18
PIF_VALUE: 38
PIF_VALUE: 36
PIF_VALUE: 37
PIF_VALUE: 38
PIF_VALUE: 38
PIF_VALUE: 40
PIF_VALUE: 21
PIF_VALUE: 38
PIF_VALUE: 21
PIF_VALUE: 36
PIF_VALUE: 38
PIF_VALUE: 36
PIF_VALUE: 39
PIF_VALUE: 1
PIF_VALUE: 38
PIF_VALUE: 20
PIF_VALUE: 38
PIF_VALUE: 4
PIF_VALUE: 38
PIF_VALUE: 40
PIF_VALUE: 35
PIF_VALUE: 39
PIF_VALUE: 38
PIF_VALUE: 15
PIF_VALUE: 28
PIF_VALUE: 3
PIF_VALUE: 38
PIF_VALUE: 37
PIF_VALUE: 38
PIF_VALUE: 38
PIF_VALUE: 39
PIF_VALUE: 36
PIF_VALUE: 3
PIF_VALUE: 37
PIF_VALUE: 36
PIF_VALUE: 37
PIF_VALUE: 39
PIF_VALUE: 39
PIF_VALUE: 23
PIF_VALUE: 38
PIF_VALUE: 6
PIF_VALUE: 38
PIF_VALUE: 37
PIF_VALUE: 38
PIF_VALUE: 21
PIF_VALUE: 18
PIF_VALUE: 38
PIF_VALUE: 37
PIF_VALUE: 39
PIF_VALUE: 35
PIF_VALUE: 0
PIF_VALUE: 40
PIF_VALUE: 23
PIF_VALUE: 17
PIF_VALUE: 37
PIF_VALUE: 38
PIF_VALUE: 36
PIF_VALUE: 38
PIF_VALUE: 27
PIF_VALUE: 37
PIF_VALUE: 39
PIF_VALUE: 37
PIF_VALUE: 21
PIF_VALUE: 22
PIF_VALUE: 37
PIF_VALUE: 21
PIF_VALUE: 29
PIF_VALUE: 19
PIF_VALUE: 5
PIF_VALUE: 39
PIF_VALUE: 38
PIF_VALUE: 21
PIF_VALUE: 21
PIF_VALUE: 28
PIF_VALUE: 38
PIF_VALUE: 37
PIF_VALUE: 18
PIF_VALUE: 39
PIF_VALUE: 21
PIF_VALUE: 30
PIF_VALUE: 5
PIF_VALUE: 34
PIF_VALUE: 37
PIF_VALUE: 37
PIF_VALUE: 39
PIF_VALUE: 35
PIF_VALUE: 38
PIF_VALUE: 29
PIF_VALUE: 30
PIF_VALUE: 38
PIF_VALUE: 37
PIF_VALUE: 18
PIF_VALUE: 38
PIF_VALUE: 38
PIF_VALUE: 37
PIF_VALUE: 38
PIF_VALUE: 21
PIF_VALUE: 38
PIF_VALUE: 18
PIF_VALUE: 39
PIF_VALUE: 39
PIF_VALUE: 38
PIF_VALUE: 38
PIF_VALUE: 22
PIF_VALUE: 5
PIF_VALUE: 2
PIF_VALUE: 38
PIF_VALUE: 22
PIF_VALUE: 39
PIF_VALUE: 20
PIF_VALUE: 38
PIF_VALUE: 3
PIF_VALUE: 39
PIF_VALUE: 21
PIF_VALUE: 21
PIF_VALUE: 38
PIF_VALUE: 37
PIF_VALUE: 37
PIF_VALUE: 38
PIF_VALUE: 38
PIF_VALUE: 40
PIF_VALUE: 38
PIF_VALUE: 39
PIF_VALUE: 31
PIF_VALUE: 37

## 2021-10-07 ASSESSMENT — ENCOUNTER SYMPTOMS: SHORTNESS OF BREATH: 0

## 2021-10-07 ASSESSMENT — PAIN DESCRIPTION - DESCRIPTORS: DESCRIPTORS: ACHING;CRAMPING

## 2021-10-07 ASSESSMENT — PAIN - FUNCTIONAL ASSESSMENT: PAIN_FUNCTIONAL_ASSESSMENT: 0-10

## 2021-10-07 ASSESSMENT — PAIN DESCRIPTION - PAIN TYPE: TYPE: SURGICAL PAIN

## 2021-10-07 ASSESSMENT — PAIN DESCRIPTION - LOCATION: LOCATION: ABDOMEN

## 2021-10-07 NOTE — OP NOTE
Operative Note  Department of Obstetrics and Gynecology  [facility]       Patient: Joycelyn Matthews   : 1975  MRN: [id number]       Acct: [de-identified]   PCP: Antonina Burr MD  Date of Procedure: 10/7/21    Pre-operative Diagnosis: AUB  Dysmenorrhea  Hx CS x2  Hx cholecystectomy  HTN  Asthma  Depression/ADHD  BMI 38    Post-operative Diagnosis: AUB  Dysmenorrhea  Hx CS x2  Hx cholecystectomy  HTN  Asthma  Depression/ADHD  BMI 38  Intra-abdominal adhesions from omentum to anterior abdominal wall     Procedure: Robotic assisted laparoscopic hysterectomy with bilateral salpingectomy     Surgeon: Dr. Jalen Soni MD     Assistant(s): Fredrick Carcamo, PGY4; Kevin Lerma, PGY2; Charlene Orozco, MS3    Anesthesia: general and pre-operative TAP block    Indications: Joycelyn Matthews is a 39 y.o. [id number] female who has been medically managed for her history of heavy, painful menses. She has failed therapy with continuous OCPs and continues to have heavy breakthrough bleeding. She also underwent dilatation and curettage, hysteroscopy, and hydrothermal ablation on 21. Despite HTA the patient continues to have heavy painful bleeding episodes. She is requesting definitive surgical management at this time. R/B/A were discussed and consent was signed and placed in the chart. Ancef 2g IV, lovenox 40mg, and TAP block were given pre-operatively. Procedure Details   The patient was seen in the pre-op room. The risks, benefits, complications, treatment options, and expected outcomes were discussed with the patient. The patient concurred with the proposed plan, giving informed consent. The patient was taken to the Operating Room and identified as Larina Minor and the procedure was verified. The patient received preoperative antibiotics. A Time Out was held and the above information confirmed. The patient underwent general endotracheal anesthesia without any complications.   The patient was prepped and draped in a dorsal lithotomy position in [facility] in normal sterile fashion. A weighted speculum was placed in vagina and the anterior lip of the cervix was grasped with a single-tooth tenaculum. The cervix was dilated slightly and the Shayna II uterine manipulator was placed without any complications, and the tenaculum was removed as well as the weighed speculum. Roth catheter was placed. Gloves were then changed and attention was turned to the abdomen. An 8-mm skin incision was made inferior to the umbilicus and a Veress needle was placed through this incision while tenting up the anterior abdominal wall. Saline bubble drop test was undergone and passed and gas was noted to flow under low pressures. Once pneumoperitoneum was obtained, the Veress needle was removed and the 8 mm camera trocar was placed through this incision while tenting up the anterior abdominal wall. Intra-abdominal placement was confirmed with the laparoscope and the underlying structures visualized were without trauma. Brief survey of the pelvis noted intra-abdominal adhesions from the omentum to the anterior abdominal wall which were removed with blunt and sharp dissection with laparoscopic grasper and scissors. Hemostasis was noted. 2 additional trocars were placed 10 cm lateral to either side of the supraumbilical trocar and another trocar on the left between the left lateral and subumbilical trocars, under direct visualization without any complication. The patient was placed in Trendelenburg. The intestines were mobilized out of the posterior cul-de-sac and then the [employer] robot was brought in and docked. Instruments were placed under direct visualization. With the physician at the console, visualization of the pelvis was undergone with above findings. The attention was turned to the right side of the uterus and the round ligament and utero-ovarian ligament were cauterized and excised.  The right adnexa and the right ureter was noted to be peristalsing well below the surgical site. The right fallopian tube was cauterized and excised from the tubo-ovarian ligament to the fimbriated end and removed from the abdomen. Then the broad ligament was opened using bipolar and scissors then dissected down to the round ligament which was cauterized and ligated. The anterior leaf of the broad ligament was then dissected to the vesicouterine peritoneal reflection and the bladder flap was created. The bladder was mobilized inferiorly. The right uterine arteries were skeletonized, cauterized and ligated at the cervical isthmus and a subsequent bite was taken, parallel to the cervix at the cardinal ligament. Then attention was turned to the left side of the uterus where the left round ligament and utero-ovarian ligament were cauterized and excised. Similarly, the left ureter was noted to be peristalsing well below the surgical site. The left fallopian tube was cauterized and excised from the tubo-ovarian ligament to the fimbriated end and removed from the abdomen. The utero-ovarian ligament was cauterized and ligated. Then the broad ligament was opened using bipolar and scissors then dissected down to the round ligament which was cauterized and ligated. The anterior leaf of the broad ligament was further dissected to the vesicouterine peritoneal reflection to complete the bladder flap. The bladder was further mobilized inferiorly and the anterior colpotomy cup could be palpated and visualized. The left uterine vessels were then skeletonized, cauterized and ligated at the cervical isthmus and a subsequent bite was taken, parallel to the cervix at the cardinal ligament. Posterior colpotomy was made circumferentially to the anterior aspect, amputating the cervix from the vagina without any complications. Then the uterus and cervix were removed en bloc vaginally without any complications.  The pelvis was irrigated/suctioned and excellent hemostasis was noted over the vaginal cuff. The lateral apices of the vaginal cuff were incorporated into the uterosacral ligaments with two figure of eights with 0-vicryl. The vaginal cuff was then closed using V-lock suture running from right to left and then oversewing to the midline. The pelvis was then irrigated/suctioned and excellent hemostasis was noted over the vaginal cuff, which was palpated vaginally after removing the glove with laparotomy sponge. Simultaneously, the cuff was also visualized laparoscopically and noted to be intact and well approximated. All pedicles were inspected with excellent hemostasis noted. There was no other intra-abdominal pathology. All instruments were removed from the abdomen under direct visualization. The daVinci was undocked and removed from the operative field. All CO2 was removed from the patient's abdomen and trocars were removed. Trocar site skin was closed using 4-0 monocryl in the subcuticular fashion and covered with dermabond. Valero catheter was removed. Sponge, lap, needle count, and instrument counts were correct x 2. The patient was extubated and taken to the post operative recovery unit in good condition. The patient received postopertaive IV Toradol for further analgesia. Dr. Rose Chandler was present for the entire operation.     Findings:  Normal external genitalia, normal vaginal mucosa without lesions, medium cervix without lesions, omental adhesions to the anterior abdominal wall at the umbilicus, normal appearing uterus, bilateral fallopian tubes and ovaries  Total IV fluids/Blood products:  1500 ml crystalloid  Urine Output:  300 ml    Estimated blood loss:  75 ml  Drains:  valero catheter removed at the end of the case  Specimens:  Uterus, cervix, bilateral fallopian tubes  Instrument and Sponge Count: Correct  Complications:  none  Condition:  stable, transferred to post anesthesia recovery room      Virginia Rivas DO  Ob/Gyn Resident  10/7/2021, 11:57 AM

## 2021-10-07 NOTE — FLOWSHEET NOTE
Dr Manuel Monsalve to the bedside, time out performed @ 0812 , Pt monitored, 02, TAP nerve block completed using 40 mL 0.5% Bupivacaine, 20 mL 0.5% Bupivacaine given per side,   pt tolerated procedure well,  Site CDI, (see charting)  Versed Given: 2 mg  Fentanyl 100 mcg  Procedure Start: 9856  Procedure End: 0297    Local Used: 1% Lidocaine used.  Approx 3 mL injected into each side

## 2021-10-07 NOTE — H&P
OB/GYN Pre-Op H&P  [address] [facility] [place]    Patient Name: Bruno Rm     Patient : 1975  Room/Bed: [facility]/NONE  Admission Date/Time: 10/7/2021  6:26 AM  Primary Care Physician: Severa Fresh, MD  MRN: [id number]    Date: 10/7/2021  Time: 7:58 AM    The patient was seen in pre-op holding. She is here for robotic assisted vaginal hysterectomy with bilateral salpingectomy, possible cystoscopy, possible abdominal hysterectomy. Bruno Rm is a 39 y.o. [id number] female who has been medically managed for her history of heavy, painful menses. She has failed therapy with continuous OCPs and continues to have heavy breakthrough bleeding. She also underwent dilatation and curettage, hysteroscopy, and hydrothermal ablation on 21. Despite HTA the patient continues to have heavy painful bleeding episodes. She is requesting definitive surgical management at this time. The procedure risks and complications were reviewed. The labs, Consent, and H&P were reviewed and updated. The patient was counseled on the possibility of  the need of a second surgery. The patient voiced understanding and had all of her questions answered. The possibility of incomplete removal of abnormal tissue was discussed. OBSTETRICAL HISTORY:   OB History    Para Term  AB Living   3 1 1 0 1 2   SAB TAB Ectopic Molar Multiple Live Births   0 0 0 0 0 1      # Outcome Date GA Lbr Marcos/2nd Weight Sex Delivery Anes PTL Lv   3 Term  40w0d  7 lb 12 oz (3.515 kg) F    GEO      Complications: Cephalopelvic Disproportion   2 AB            1                 PAST MEDICAL HISTORY:   has a past medical history of ADHD, Asthma, COVID-19, Depression, Under care of team, Varicosities, and Well adult health check. PAST SURGICAL HISTORY:   has a past surgical history that includes  section; Dilation and curettage of uterus;  Cholecystectomy; and Dilation and curettage of uterus (N/A, 04/16/2021). ALLERGIES:  Allergies as of 09/09/2021 - Fully Reviewed 09/09/2021   Allergen Reaction Noted    Iv contrast [iodides] Itching 12/03/2014    Dog epithelium allergy skin test Rash 04/05/2021       MEDICATIONS:  Current Facility-Administered Medications   Medication Dose Route Frequency Provider Last Rate Last Admin    ceFAZolin (ANCEF) 2000 mg in dextrose 5 % 50 mL IVPB  2,000 mg IntraVENous Once Jamari Patel MD        lactated ringers infusion 1,000 mL  1,000 mL IntraVENous Continuous Rebecca Hanson MD 50 mL/hr at 10/07/21 0745 1,000 mL at 10/07/21 0745    midazolam PF (VERSED) injection 2 mg  2 mg IntraVENous Once Jo-Ann Lamb MD        fentaNYL (SUBLIMAZE) injection 100 mcg  100 mcg IntraVENous Once Jo-Ann Lamb MD        bupivacaine (PF) (MARCAINE) 0.5 % injection 250 mg  50 mL Infiltration Once Jo-Ann Lamb MD           FAMILY HISTORY:  family history includes Diabetes in her brother; Heart Attack in her father; No Known Problems in her mother. SOCIAL HISTORY:   reports that she has never smoked. She has never used smokeless tobacco. She reports current alcohol use. She reports that she does not use drugs.     VITALS:  Vitals:    10/07/21 0729   BP: (!) 152/100   Pulse: 89   Resp: 18   Temp: 97.5 °F (36.4 °C)   TempSrc: Temporal   SpO2: 98%   Weight: 238 lb (108 kg)   Height: 5' 6\" (1.676 m)                                                                                                                             PHYSICAL EXAM:     Unchanged from Prior H&P  CONSTITUTIONAL:  Alert and oriented, no acute distress  HEAD: normocephalic, atraumatic  EYES: Pupils equal and reactive to light, Extraocular muscles intact, sclera non icteric  ENT: Mucus membranes moist, No otorrhea, no rhinorrhea  NECK:  supple, symmetrical, trachea midline   LUNGS:  Good air movement bilaterally, unlabored respirations, no wheezes or rhonchi  CARDIOVASCULAR: Regular rate and rhythm, no murmurs rubs or gallops  ABDOMEN: soft, non tender, non distended, no rebound or guarding, no hernias, no hepatomegaly, no splenomegly  MUSCULOSKELETAL:  Equal strength bilaterally, normal muscle tone  SKIN: No abscess or rash  NEUROLOGIC:  Cranial nerves 2-12 grossly intact, no focal deficits  PSYCH: affect appropriate  Pelvic Exam: deferred to OR      LAB RESULTS:  Hospital Outpatient Visit on 09/23/2021   Component Date Value Ref Range Status    WBC 09/23/2021 9.0  3.5 - 11.3 k/uL Final    RBC 09/23/2021 4.87  3.95 - 5.11 m/uL Final    Hemoglobin 09/23/2021 12.6  11.9 - 15.1 g/dL Final    Hematocrit 09/23/2021 40.9  36.3 - 47.1 % Final    MCV 09/23/2021 84.0  82.6 - 102.9 fL Final    MCH 09/23/2021 25.9  25.2 - 33.5 pg Final    MCHC 09/23/2021 30.8  28.4 - 34.8 g/dL Final    RDW 09/23/2021 14.9* 11.8 - 14.4 % Final    Platelets 19/62/7456 405  138 - 453 k/uL Final    MPV 09/23/2021 8.8  8.1 - 13.5 fL Final    NRBC Automated 09/23/2021 0.0  0.0 per 100 WBC Final    Expiration Date 09/23/2021 10/10/2021,2359   Final    Arm Band Number 09/23/2021 [id number]   Final    ABO/Rh 09/23/2021 O POSITIVE   Final    Antibody Screen 09/23/2021 NEGATIVE   Final    Glucose 09/23/2021 99  70 - 99 mg/dL Final    BUN 09/23/2021 8  6 - 20 mg/dL Final    CREATININE 09/23/2021 0.52  0.50 - 0.90 mg/dL Final    Bun/Cre Ratio 09/23/2021 NOT REPORTED  9 - 20 Final    Calcium 09/23/2021 9.2  8.6 - 10.4 mg/dL Final    Sodium 09/23/2021 138  135 - 144 mmol/L Final    Potassium 09/23/2021 4.3  3.7 - 5.3 mmol/L Final    Chloride 09/23/2021 100  98 - 107 mmol/L Final    CO2 09/23/2021 24  20 - 31 mmol/L Final    Anion Gap 09/23/2021 14  9 - 17 mmol/L Final    GFR Non- 09/23/2021 >60  >60 mL/min Final    GFR  09/23/2021 >60  >60 mL/min Final    GFR Comment 09/23/2021        Final    Comment: Average GFR for 38-51 years old:   80 mL/min/1.73sq m  Chronic Kidney Disease:   <60 mL/min/1.73sq m  Kidney failure:   <15 mL/min/1.73sq m              eGFR calculated using average adult body mass. Additional eGFR calculator available at:        [employer]idge[employer]            GFR Staging 09/23/2021 NOT REPORTED   Final    Ventricular Rate 09/23/2021 101  BPM Final    Atrial Rate 09/23/2021 101  BPM Final    P-R Interval 09/23/2021 182  ms Final    QRS Duration 09/23/2021 98  ms Final    Q-T Interval 09/23/2021 390  ms Final    QTc Calculation (Bazett) 09/23/2021 505  ms Final    P Axis 09/23/2021 71  degrees Final    R Axis 09/23/2021 -75  degrees Final    T Axis 09/23/2021 62  degrees Final    hCG Qual 09/23/2021 NEGATIVE  NEGATIVE Final    Comment: Specimens with hCG levels near the threshold of the test (25 mIU/mL) may give a negative or   indeterminate result. In such cases, another test should be performed with a new specimen   in 48-72 hours. If early pregnancy is suspected clinically in this setting, correlation   with quantitative serum b-hCG level is suggested. [employer] has confirmed the use of plasma for this test. This has not been cleared   or approved by the U.S. Food and Drug Administration. The FDA has determined that such   clearance is not necessary. DIAGNOSTICS:  No results found. DIAGNOSIS & PLAN:  1. Heavy, painful periods    - Proceed with planned procedure: Robotic assisted vaginal hysterectomy, bilateral salpingectomy, possible cystoscopy, possible abdominal hysterectomy   - Consent signed, on chart. - The patient is ready for transport to the operative suite. Counseling: The patient was counseled on all options both medical and surgical, conservative as well as definitive. She has elected to proceed with the procedure as stated above. The patient was counseled on the procedure. Risks and complications were reviewed in detail. The patients orders, labs, consents have been completed.  The history and physical as well as all supporting surgical documentation will be forwarded to the pre-operative holding area. The patient is aware that this procedure may not alleviate her symptoms. That there may be a necessity for a second surgery and that there may be an incomplete removal of abnormal tissue.     Daryl Meigs, DO  Ob/Gyn Resident  Pager: [phone] [address] [facility]  10/7/2021, 7:58 AM

## 2021-10-07 NOTE — BRIEF OP NOTE
Brief Operative Note  Department of Obstetrics and Gynecology  [facility]     Patient: Leighann Wynn   : 1975  MRN: [id number]       Acct: [de-identified]   Date of Procedure: 10/7/21     Pre-operative Diagnosis:  39 y.o. female    Abnormal uterine bleeding   Heavy, painful periods    Post-operative Diagnosis: Same as above    Procedure: Robotic assisted laparoscopic hysterectomy with bilateral salpingectomy    Surgeon: Dr. Lon Mon MD     Assistant(s): Shana Juan, PGY4; Gasper De Dios, PGY2; Kalee Jones, MS3    Anesthesia: general    Findings:  Normal external genitalia, normal vaginal mucosa without lesions, medium cervix without lesions, omental adhesions to the anterior abdominal wall at the umbilicus, normal appearing uterus, bilateral fallopian tubes and ovaries  Total IV fluids/Blood products:  1500 ml crystalloid  Urine Output:  300 ml    Estimated blood loss:  75 ml  Drains:  valero catheter removed at the end of the case  Specimens:  Uterus, cervix, bilateral fallopian tubes  Instrument and Sponge Count: Correct  Complications:  none  Condition:  stable, transferred to post anesthesia recovery room    See full operative report for further details. Sonya Amaya DO  Ob/Gyn Resident  Pager: [phone]  10/7/2021, 11:50 AM    Resident Physician Statement  I have personally seen the patient. I agree with the assessment, plan and orders as documented. I have made changes to the above note as needed. I have discussed the case with above named attending.      Shana Juan DO [place]  OBGYN Resident  10/8/2021  11:21 AM

## 2021-10-07 NOTE — ANESTHESIA PRE PROCEDURE
Department of Anesthesiology  Preprocedure Note       Name:  Priscilla Kellogg   Age:  39 y.o.  :  1975                                          MRN:  [id number]         Date:  10/7/2021      Surgeon: Lee Bautista):  Dee Galaviz MD    Procedure: Procedure(s):  XI ROBOTIC LAPAROSCOPIC HYSTERECTOMY WITH SALPINGECTOMY, POSSIBLE CYSTOSCOPY    Medications prior to admission:   Prior to Admission medications    Medication Sig Start Date End Date Taking? Authorizing Provider   Pseudoephedrine HCl (DECONGESTANT PO) Take by mouth   Yes Historical Provider, MD BANG ELLIPTA 200 MCG/ACT AEPB Inhale 1 puff into the lungs daily 21  Yes Historical Provider, MD   ADDERALL XR 10 MG capsule Take 10 mg by mouth daily as needed.  21  Yes Historical Provider, MD   norethindrone-ethinyl estradiol (OVCON-35, 28,) 0.4-35 MG-MCG per tablet Take 1 tablet by mouth daily Do not take the last 7 placebo pills in each pack 21  Yes Dee Galaviz MD   ibuprofen (ADVIL;MOTRIN) 800 MG tablet Take 1 tablet by mouth 2 times daily as needed for Pain 21  Yes Dee Galaviz MD   ibuprofen (ADVIL;MOTRIN) 600 MG tablet Take 1 tablet by mouth every 6 hours 21  Yes Fabiola Sultana,    ondansetron (ZOFRAN ODT) 4 MG disintegrating tablet Take 1 tablet by mouth every 8 hours as needed for Nausea or Vomiting 21  Yes Fabiola Sultana, DO   Cyanocobalamin (VITAMIN B-12) 5000 MCG TBDP Take 1,000 mcg by mouth daily   Yes Historical Provider, MD   Cholecalciferol (VITAMIN D3 PO) Take 1,000 Units by mouth daily   Yes Historical Provider, MD   loratadine (CLARITIN) 10 MG tablet Take 10 mg by mouth daily as needed   Yes Historical Provider, MD   FLUoxetine (PROZAC) 20 MG capsule Take 20 mg by mouth daily   Yes Historical Provider, MD   montelukast (SINGULAIR) 10 MG tablet Take 10 mg by mouth nightly   Yes Historical Provider, MD   ALBUTEROL IN Inhale 2 puffs into the lungs as needed    Yes Historical Provider, MD       Current medications:    Current Facility-Administered Medications   Medication Dose Route Frequency Provider Last Rate Last Admin    ceFAZolin (ANCEF) 2000 mg in dextrose 5 % 50 mL IVPB  2,000 mg IntraVENous Once Dee Galaviz MD        enoxaparin (LOVENOX) injection 40 mg  40 mg SubCUTAneous Once Dee Galaviz MD        lactated ringers infusion 1,000 mL  1,000 mL IntraVENous Continuous Bird Villarreal MD           Allergies: Allergies   Allergen Reactions    Iv Contrast [Iodides] Itching     Given during CT scan. Lips tingled.  Dog Epithelium Allergy Skin Test Rash       Problem List:    Patient Active Problem List   Diagnosis Code    PGDM O24.919    Asthma J45.909    D&C, hysteroscopy, HTA ablation 21 Z98.890    Menorrhagia N92.0    Dysmenorrhea N94.6    Depression F32. A    ADHD F90.9       Past Medical History:        Diagnosis Date    ADHD     Asthma     ALLERGIST. DR. Davina Ko - LAST VISIT 2021    COVID-19 02/15/2021    no symptoms    Depression     Under care of team     allergist-[facility]-last visit aug 2021    Varicosities     Well adult health check 2021    PCP- DR. Jordon Webster [facility]- LAST VISIT aug 2021       Past Surgical History:        Procedure Laterality Date     SECTION      X2    CHOLECYSTECTOMY      DILATION AND CURETTAGE OF UTERUS      DILATION AND CURETTAGE OF UTERUS N/A 2021    HYDROTHERMAL - DILATATION AND CURETTAGE, HYSTEROSCOPY, ENDOMETRIAL ABLATION performed by Dee Galaviz MD at [address] [facility] History:    Social History     Tobacco Use    Smoking status: Never Smoker    Smokeless tobacco: Never Used   Substance Use Topics    Alcohol use: Yes     Comment: RARELY -  ONCE A MONTH                                Counseling given: Not Answered      Vital Signs (Current):   Vitals:    10/07/21 0729   BP: (!) 152/100   Pulse: 89   Resp: 18   Temp: 97.5 °F (36.4 °C)   TempSrc: Temporal   SpO2: 98%   Weight: 238 lb (108 kg)   Height: 5' 6\" (1.676 m)                                              BP Readings from Last 3 Encounters:   10/07/21 (!) 152/100   09/23/21 (!) 141/91   09/09/21 122/82       NPO Status: Time of last liquid consumption: 0600                        Time of last solid consumption: 2000                        Date of last liquid consumption: 10/07/21                        Date of last solid food consumption: 10/06/21    BMI:   Wt Readings from Last 3 Encounters:   10/07/21 238 lb (108 kg)   09/23/21 238 lb (108 kg)   09/09/21 232 lb (105.2 kg)     Body mass index is 38.41 kg/m². CBC:   Lab Results   Component Value Date    WBC 9.0 09/23/2021    RBC 4.87 09/23/2021    RBC 3.88 03/13/2012    HGB 12.6 09/23/2021    HCT 40.9 09/23/2021    MCV 84.0 09/23/2021    RDW 14.9 09/23/2021     09/23/2021     03/13/2012       CMP:   Lab Results   Component Value Date     09/23/2021    K 4.3 09/23/2021     09/23/2021    CO2 24 09/23/2021    BUN 8 09/23/2021    CREATININE 0.52 09/23/2021    GFRAA >60 09/23/2021    LABGLOM >60 09/23/2021    GLUCOSE 99 09/23/2021    CALCIUM 9.2 09/23/2021       POC Tests: No results for input(s): POCGLU, POCNA, POCK, POCCL, POCBUN, POCHEMO, POCHCT in the last 72 hours.     Coags: No results found for: PROTIME, INR, APTT    HCG (If Applicable):   Lab Results   Component Value Date    HCG NEGATIVE 04/16/2021        ABGs: No results found for: PHART, PO2ART, FBJ0HGA, HHF7VVO, BEART, L2XOJOFQ     Type & Screen (If Applicable):  No results found for: LABABO, LABRH    Drug/Infectious Status (If Applicable):  No results found for: HIV, HEPCAB    COVID-19 Screening (If Applicable):   Lab Results   Component Value Date    COVID19 DETECTED 02/15/2021           Anesthesia Evaluation  Patient summary reviewed and Nursing notes reviewed no history of anesthetic complications:   Airway: Mallampati: II  TM distance: >3 FB Neck ROM: full  Mouth opening: > = 3 FB Dental:          Pulmonary:normal exam  breath sounds clear to auscultation  (+) asthma:     (-) COPD, shortness of breath, recent URI and sleep apnea                           Cardiovascular:Negative CV ROS  Exercise tolerance: good (>4 METS),       (-) hypertension, past MI, CAD, CABG/stent,  angina,  CHF, orthopnea and  ALMANZAR      Rhythm: regular  Rate: normal                    Neuro/Psych:   (+) psychiatric history:   (-) seizures, TIA and CVA           GI/Hepatic/Renal:   (+) morbid obesity     (-) GERD       Endo/Other:    (+) Diabetes (Hx GDM), .                  ROS comment: AUB Abdominal:             Vascular: negative vascular ROS. Other Findings:           Anesthesia Plan      general and regional     ASA 2     (GA ET  Pre-op TAP block)  Induction: intravenous. MIPS: Postoperative opioids intended and Prophylactic antiemetics administered. Anesthetic plan and risks discussed with patient.       Plan discussed with CRNA and surgical team.                Brie Martinez MD   10/7/2021

## 2021-10-07 NOTE — ANESTHESIA POSTPROCEDURE EVALUATION
Department of Anesthesiology  Postprocedure Note    Patient: Gladis Sauer  MRN: [id number]  YOB: 1975  Date of evaluation: 10/7/2021  Time:  1:31 PM     Procedure Summary     Date: 10/07/21 Room / Location: [address]    Anesthesia Start: 4800 Anesthesia Stop: 1265    Procedure: XI ROBOTIC LAPAROSCOPIC HYSTERECTOMY WITH SALPINGECTOMY (Bilateral ) Diagnosis: (ABNORMAL UTERINE BLEEDING, FAILED ABLATION, ABDOMINAL BLOATING)    Surgeons: Angelica Pappas MD Responsible Provider: Larisa Leyva MD    Anesthesia Type: general, regional ASA Status: 2          Anesthesia Type: general, regional    Laurence Phase I: Laurence Score: 9    Laurence Phase II:      Last vitals: Reviewed and per EMR flowsheets.        Anesthesia Post Evaluation    Patient location during evaluation: PACU  Patient participation: complete - patient participated  Level of consciousness: awake and alert  Pain score: 3  Airway patency: patent  Nausea & Vomiting: no nausea and no vomiting  Complications: no  Cardiovascular status: hemodynamically stable  Respiratory status: acceptable, spontaneous ventilation and room air  Hydration status: euvolemic

## 2021-10-08 LAB — SURGICAL PATHOLOGY REPORT: NORMAL

## 2021-10-21 ENCOUNTER — OFFICE VISIT (OUTPATIENT)
Dept: OBGYN CLINIC | Age: 46
End: 2021-10-21

## 2021-10-21 VITALS
BODY MASS INDEX: 37.98 KG/M2 | DIASTOLIC BLOOD PRESSURE: 72 MMHG | HEIGHT: 67 IN | WEIGHT: 242 LBS | SYSTOLIC BLOOD PRESSURE: 122 MMHG

## 2021-10-21 DIAGNOSIS — Z98.890 POSTOPERATIVE STATE: Primary | ICD-10-CM

## 2021-10-21 PROCEDURE — 99024 POSTOP FOLLOW-UP VISIT: CPT | Performed by: OBSTETRICS & GYNECOLOGY

## 2021-10-21 NOTE — PATIENT INSTRUCTIONS
You may slowly start to increase your normal daily activities but refrain from intercourse. Return to the office in 4 to 6 weeks for your final postoperative checkup.

## 2021-10-21 NOTE — PROGRESS NOTES
Gladis Sauer returns today for postop checkup after having an unremarkable on 10/7/2021  for abnormal uterine bleeding, pelvic pain and failed HTA . She denies any fever, chills, nausea/vomiting, significant abdominal/pelvic discomfort, vaginal bleeding or UTI symptoms. She's having normal bowel and urinary function and ambulating with no significant difficulty. Operative findings revealed nongravid normal-appearing uterus without evidence of fibroids or pelvic adhesions. Bilateral fallopian tubes and ovaries also grossly normal.  Pathology report shows : Inactive endometrium and adenomyosis with normal bilateral fallopian tubes. Physical exam      Vitals:    10/21/21 1124   BP: 122/72   Site: Right Upper Arm   Position: Sitting   Cuff Size: Large Adult   Weight: 242 lb (109.8 kg)   Height: 5' 7\" (1.702 m)       General appearance: Patient appears to be in no significant distress. Lungs are clear to auscultation in all fields bilaterally without wheezes, rales or rhonchi. Cardiac exam with normal sinus rhythm and rate without murmurs. The abdomen has a well healed incision's and is non-tender without signs of infection. There is no organomegaly or CVAT. Bowel sounds are normally active. Extremities nontender without edema. Assessment/Plan:     ICD-10-CM    1. Postoperative state  Z98.890          Operative findings, and pathology report discussed and Intra-Op photos reviewed with patient. She was instructed to slowly resume her normal activities and return to the office in 4-6 weeks or prn. Rufus Anderson MD, [address] [facility], [address] [place] [address].   [address] [place] [address],[address]

## 2021-11-23 ENCOUNTER — OFFICE VISIT (OUTPATIENT)
Dept: OBGYN CLINIC | Age: 46
End: 2021-11-23

## 2021-11-23 VITALS
BODY MASS INDEX: 37.67 KG/M2 | WEIGHT: 240 LBS | HEIGHT: 67 IN | DIASTOLIC BLOOD PRESSURE: 80 MMHG | SYSTOLIC BLOOD PRESSURE: 120 MMHG

## 2021-11-23 DIAGNOSIS — Z98.890 POSTOPERATIVE STATE: Primary | ICD-10-CM

## 2021-11-23 PROCEDURE — 99024 POSTOP FOLLOW-UP VISIT: CPT | Performed by: OBSTETRICS & GYNECOLOGY

## 2021-11-23 NOTE — PATIENT INSTRUCTIONS
You may resume all your normal daily activities. Return to the office in May for an annual visit or as needed. Happy Thanksgiving and have a safe and healthy holiday season!

## 2021-11-23 NOTE — PROGRESS NOTES
Logan Bai returns today for her 6-week postop checkup after having an unremarkable RAVH/BS. She is only having a very minor amount of staining on her daily light pad. She has not been sexually active. She denies any fever, chills, nausea/vomiting, significant abdominal/pelvic discomfort or UTI symptoms. She's having normal bowel and urinary function and ambulating with no difficulty. Physical exam      Vitals:    11/23/21 1432   BP: 120/80   Site: Right Upper Arm   Position: Sitting   Cuff Size: Large Adult   Weight: 240 lb (108.9 kg)   Height: 5' 7\" (1.702 m)       General appearance: Patient appears to be in no significant distress. Lungs are clear to auscultation in all fields bilaterally without wheezes, rales or rhonchi. Cardiac exam with normal sinus rhythm and rate without murmurs. The abdomen has a well healed incisions and is non-tender without signs of infection. There is no organomegaly or CVAT. Bowel sounds are normally active. No vulvar cervical lesions. Speculum exam reveals a scant amount of brown-tinged discharge present. The cuff is intact, well supported and suture material visualized. Bimanual confirms an intact cuff . Adnexa nontender without abnormal masses bilaterally. Extremities nontender without edema. Assessment/Plan:     ICD-10-CM    1. Postoperative state  Z98.890        Discussed current ASCCP guidelines and will discontinue Pap smear surveillance. She was instructed to slowly resume her normal activities and return to the office in May for her annual or prn. Jeimy Aaron MD, [address] [facility], [place].   [address] [place] [address],[address]

## 2022-01-31 RX ORDER — DOCUSATE SODIUM 100 MG/1
CAPSULE, LIQUID FILLED ORAL
Qty: 60 CAPSULE | Refills: 0 | Status: SHIPPED | OUTPATIENT
Start: 2022-01-31

## 2024-01-12 DIAGNOSIS — Z12.31 SCREENING MAMMOGRAM FOR BREAST CANCER: Primary | ICD-10-CM

## 2024-03-11 ENCOUNTER — HOSPITAL ENCOUNTER (OUTPATIENT)
Dept: MAMMOGRAPHY | Age: 49
Discharge: HOME | End: 2024-03-13
Attending: OBSTETRICS & GYNECOLOGY
Payer: COMMERCIAL

## 2024-03-11 VITALS — HEIGHT: 66 IN | WEIGHT: 230 LBS | BODY MASS INDEX: 36.96 KG/M2

## 2024-03-11 DIAGNOSIS — Z12.31 SCREENING MAMMOGRAM FOR BREAST CANCER: ICD-10-CM

## 2024-03-11 PROCEDURE — 77063 BREAST TOMOSYNTHESIS BI: CPT

## 2025-04-11 ENCOUNTER — OFFICE VISIT (OUTPATIENT)
Dept: OBGYN CLINIC | Age: 50
End: 2025-04-11
Payer: COMMERCIAL

## 2025-04-11 VITALS
DIASTOLIC BLOOD PRESSURE: 89 MMHG | WEIGHT: 251.4 LBS | HEIGHT: 66 IN | SYSTOLIC BLOOD PRESSURE: 137 MMHG | BODY MASS INDEX: 40.4 KG/M2

## 2025-04-11 DIAGNOSIS — Z01.419 ENCOUNTER FOR GYNECOLOGICAL EXAMINATION: Primary | ICD-10-CM

## 2025-04-11 DIAGNOSIS — Z12.31 ENCOUNTER FOR SCREENING MAMMOGRAM FOR BREAST CANCER: ICD-10-CM

## 2025-04-11 DIAGNOSIS — E66.01 MORBID OBESITY WITH BODY MASS INDEX (BMI) OF 40.0 TO 44.9 IN ADULT: ICD-10-CM

## 2025-04-11 PROCEDURE — 99386 PREV VISIT NEW AGE 40-64: CPT | Performed by: NURSE PRACTITIONER

## 2025-04-11 ASSESSMENT — ENCOUNTER SYMPTOMS
ALLERGIC/IMMUNOLOGIC NEGATIVE: 1
SHORTNESS OF BREATH: 0
RESPIRATORY NEGATIVE: 1
GASTROINTESTINAL NEGATIVE: 1
COUGH: 0
ABDOMINAL DISTENTION: 0
BACK PAIN: 0

## 2025-04-11 NOTE — PROGRESS NOTES
Chaperone for Intimate Exam  Chaperone was offered as part of the rooming process. Patient declined and agrees to continue with exam without a chaperone.  Chaperone: NONE       
cancer  WENDY MIRNA DIGITAL SCREEN BILATERAL      3. Morbid obesity with body mass index (BMI) of 40.0 to 44.9 in adult  [facility] HospitalGracie Alcantar CNP, [facility] ManagementFormerly [facility]          1. Encounter for gynecological examination  2. Encounter for screening mammogram for breast cancer  -     WENDY MIRNA DIGITAL SCREEN BILATERAL; Future  3. Morbid obesity with body mass index (BMI) of 40.0 to 44.9 in adult  -     Gracie Rodriguez CNP, [facility] ManagementFormerly [facility]       Is agreeable to consult.  Not interested in meds or surgery                Hereditary Breast, Ovarian, Colon and Uterine Cancer screening Done.          Tobacco & Secondary smoke risks reviewed; instructed on cessation and avoidance    - Pap no longer indicated  -Discussed menopausal symptoms, HRT, incontinence.  - Screening mammogram discussed and advised yearly if normalstarting at age 40.  - Calcium and Vitamin D dosing reviewed.  - Colonoscopy screening reviewed.   - General diet and exercise reviewed.   - Routine health maintenance per patients PCP.    Return in about 1 year (around 4/11/2026) for annual exam.        Electronically signed by WILFRIDO Reveles CNP on 4/11/25 at 9:50 AM ARLEEN

## 2025-07-11 ENCOUNTER — HOSPITAL ENCOUNTER (OUTPATIENT)
Dept: MAMMOGRAPHY | Age: 50
Discharge: HOME | End: 2025-07-13
Payer: COMMERCIAL

## 2025-07-11 VITALS — BODY MASS INDEX: 38.14 KG/M2 | HEIGHT: 67 IN | WEIGHT: 243 LBS

## 2025-07-11 DIAGNOSIS — Z12.31 ENCOUNTER FOR SCREENING MAMMOGRAM FOR BREAST CANCER: ICD-10-CM

## 2025-07-11 PROCEDURE — 77063 BREAST TOMOSYNTHESIS BI: CPT

## (undated) DEVICE — COUNTER NDL 10 COUNT HLD 20 FOAM BLK SGL MAG

## (undated) DEVICE — SOLUTION SCRB 4OZ 2% CHG FOR SURG SCRBBING HND WSH

## (undated) DEVICE — FLUID MGMT SYS FLUENT KIT 6/PK

## (undated) DEVICE — BAG WASTE MYSOSURE FLUENT

## (undated) DEVICE — TOTAL TRAY, 16FR 10ML SIL FOLEY, URN: Brand: MEDLINE

## (undated) DEVICE — CANNULA SEAL

## (undated) DEVICE — GLOVE SURG SZ 65 THK91MIL LTX FREE SYN POLYISOPRENE

## (undated) DEVICE — SVMMC GYN MIN PK

## (undated) DEVICE — ARM DRAPE

## (undated) DEVICE — MARKER,SKIN,WI/RULER AND LABELS: Brand: MEDLINE

## (undated) DEVICE — PAD,SANITARY,11 IN,MAXI,W/WINGS,N-STRL: Brand: MEDLINE

## (undated) DEVICE — INTENDED FOR TISSUE SEPARATION, AND OTHER PROCEDURES THAT REQUIRE A SHARP SURGICAL BLADE TO PUNCTURE OR CUT.: Brand: BARD-PARKER ® CARBON RIB-BACK BLADES

## (undated) DEVICE — DEVICE TRCR 12X9X3IN WHT CLSR DISP OMNICLOSE

## (undated) DEVICE — INSUFFLATION NEEDLE TO ESTABLISH PNEUMOPERITONEUM.: Brand: INSUFFLATION NEEDLE

## (undated) DEVICE — SUTURE SZ 0 27IN 5/8 CIR UR-6  TAPER PT VIOLET ABSRB VICRYL J603H

## (undated) DEVICE — PUMP SUC IRR TBNG L10FT W/ HNDPC ASSEMB STRYKEFLOW 2

## (undated) DEVICE — TIP COVER ACCESSORY

## (undated) DEVICE — 40580 - THE PINK PAD - ADVANCED TRENDELENBURG POSITIONING KIT: Brand: 40580 - THE PINK PAD - ADVANCED TRENDELENBURG POSITIONING KIT

## (undated) DEVICE — TRAP TISS FLUENT FLD MGMT SYS

## (undated) DEVICE — GOWN,SIRUS,NONRNF,SETINSLV,XL,20/CS: Brand: MEDLINE

## (undated) DEVICE — CONNECTOR,TUBING,5-IN-1,NON-STERILE: Brand: MEDLINE INDUSTRIES, INC.

## (undated) DEVICE — GLOVE ORANGE PI 7 1/2   MSG9075

## (undated) DEVICE — SET ENDOSCP SEAL HYSTEROSCOPE RIG OUTFLO CHN DISP MYOSURE

## (undated) DEVICE — ELECTRO LUBE IS A SINGLE PATIENT USE DEVICE THAT IS INTENDED TO BE USED ON ELECTROSURGICAL ELECTRODES TO REDUCE STICKING.: Brand: KEY SURGICAL ELECTRO LUBE

## (undated) DEVICE — UNDERPANTS MAT L XL SEAMLESS CLR CODE WAISTBAND KNIT

## (undated) DEVICE — ADHESIVE SKIN CLOSURE TOP 36 CC HI VISC DERMBND MINI

## (undated) DEVICE — BLADELESS OBTURATOR: Brand: WECK VISTA

## (undated) DEVICE — SVMMC GYN ROBOTIC PK

## (undated) DEVICE — Device: Brand: UTERINE ELEVATOR PRO

## (undated) DEVICE — GLOVE SURG SZ 6 THK91MIL LTX FREE SYN POLYISOPRENE ANTI

## (undated) DEVICE — SUTURE V-LOC 180 SZ 0 L9IN ABSRB GRN GS-21 L37MM 1/2 CIR VLOCL0346

## (undated) DEVICE — Z DISCONTINUED USE 2272124 DRAPE SURG XL N INVASIVE 2 LAYR DISP

## (undated) DEVICE — SCISSOR SURG METZ CRV TIP

## (undated) DEVICE — TOWEL,OR,DSP,ST,NATURAL,DLX,4/PK,20PK/CS: Brand: MEDLINE

## (undated) DEVICE — SUTURE VCRL SZ 0 L27IN ABSRB UD L26MM CT-2 1/2 CIR J270H

## (undated) DEVICE — AIRSEAL 12 MM ACCESS PORT AND PALM GRIP OBTURATOR WITH BLADELESS OPTICAL TIP, 120 MM LENGTH: Brand: AIRSEAL

## (undated) DEVICE — Device

## (undated) DEVICE — GOWN,AURORA,NONREINFORCED,LARGE: Brand: MEDLINE

## (undated) DEVICE — SUTURE PERMAHAND SZ 0 L30IN NONABSORBABLE BLK L26MM SH 1/2 K834H

## (undated) DEVICE — CONTAINER,SPECIMEN,4OZ,OR STRL: Brand: MEDLINE

## (undated) DEVICE — DECANTER BAG 9": Brand: MEDLINE INDUSTRIES, INC.

## (undated) DEVICE — TUBING, SUCTION, 9/32" X 20', STRAIGHT: Brand: MEDLINE INDUSTRIES, INC.

## (undated) DEVICE — REDUCER: Brand: ENDOWRIST

## (undated) DEVICE — 1016 S-DRAPE IRRIG POUCH 10/BOX: Brand: STERI-DRAPE™

## (undated) DEVICE — SOLUTION ANTIFOG VIS SYS CLEARIFY LAPSCP

## (undated) DEVICE — SEAL

## (undated) DEVICE — SUTURE VCRL SZ 0 L36IN ABSRB UD L36MM CT-1 1/2 CIR J946H

## (undated) DEVICE — CUP MANIP DIA2.5CM PNEUMO OCCL BLLN FOR ES RUMI II KOH

## (undated) DEVICE — DRAPE,REIN 53X77,STERILE: Brand: MEDLINE

## (undated) DEVICE — GARMENT,MEDLINE,DVT,INT,CALF,MED, GEN2: Brand: MEDLINE

## (undated) DEVICE — APPLICATOR MEDICATED 26 CC SOLUTION HI LT ORNG CHLORAPREP

## (undated) DEVICE — PLUMEPORT SEO LAPAROSCOPIC SMOKE FILTRATION DEVICE: Brand: PLUMEPORT

## (undated) DEVICE — TROCAR: Brand: KII FIOS FIRST ENTRY

## (undated) DEVICE — GLOVE ORANGE PI 7   MSG9070

## (undated) DEVICE — PROTECTOR ULN NRV PUR FOAM HK LOOP STRP ANATOMICALLY

## (undated) DEVICE — SUTURE MCRYL SZ 4-0 L18IN ABSRB UD L16MM PC-3 3/8 CIR PRIM Y845G

## (undated) DEVICE — TRI-LUMEN FILTERED TUBE SET WITH ACTIVATED CHARCOAL FILTER: Brand: AIRSEAL